# Patient Record
Sex: MALE | Employment: UNEMPLOYED | URBAN - METROPOLITAN AREA
[De-identification: names, ages, dates, MRNs, and addresses within clinical notes are randomized per-mention and may not be internally consistent; named-entity substitution may affect disease eponyms.]

---

## 2023-06-03 ENCOUNTER — HOSPITAL ENCOUNTER (INPATIENT)
Facility: CLINIC | Age: 28
LOS: 3 days | Discharge: PSYCHIATRIC OR SUBSTANCE USE FACILITY | DRG: 918 | End: 2023-06-07
Attending: EMERGENCY MEDICINE | Admitting: STUDENT IN AN ORGANIZED HEALTH CARE EDUCATION/TRAINING PROGRAM
Payer: MEDICARE

## 2023-06-03 DIAGNOSIS — T50.912A: ICD-10-CM

## 2023-06-03 DIAGNOSIS — F41.9 ANXIETY: Primary | ICD-10-CM

## 2023-06-03 DIAGNOSIS — G47.00 INSOMNIA, UNSPECIFIED TYPE: ICD-10-CM

## 2023-06-03 DIAGNOSIS — Z11.52 ENCOUNTER FOR SCREENING LABORATORY TESTING FOR COVID-19 VIRUS: ICD-10-CM

## 2023-06-03 DIAGNOSIS — T50.902A INTENTIONAL OVERDOSE, INITIAL ENCOUNTER (H): ICD-10-CM

## 2023-06-03 DIAGNOSIS — T14.91XA SUICIDE ATTEMPT (H): ICD-10-CM

## 2023-06-03 LAB
BASOPHILS # BLD AUTO: 0.1 10E3/UL (ref 0–0.2)
BASOPHILS NFR BLD AUTO: 1 %
EOSINOPHIL # BLD AUTO: 0.3 10E3/UL (ref 0–0.7)
EOSINOPHIL NFR BLD AUTO: 3 %
ERYTHROCYTE [DISTWIDTH] IN BLOOD BY AUTOMATED COUNT: 13.1 % (ref 10–15)
HCT VFR BLD AUTO: 37.7 % (ref 40–53)
HGB BLD-MCNC: 12.6 G/DL (ref 13.3–17.7)
IMM GRANULOCYTES # BLD: 0 10E3/UL
IMM GRANULOCYTES NFR BLD: 0 %
INR PPP: 1.17 (ref 0.85–1.15)
LYMPHOCYTES # BLD AUTO: 2.9 10E3/UL (ref 0.8–5.3)
LYMPHOCYTES NFR BLD AUTO: 30 %
MCH RBC QN AUTO: 29.5 PG (ref 26.5–33)
MCHC RBC AUTO-ENTMCNC: 33.4 G/DL (ref 31.5–36.5)
MCV RBC AUTO: 88 FL (ref 78–100)
MONOCYTES # BLD AUTO: 0.8 10E3/UL (ref 0–1.3)
MONOCYTES NFR BLD AUTO: 8 %
NEUTROPHILS # BLD AUTO: 5.6 10E3/UL (ref 1.6–8.3)
NEUTROPHILS NFR BLD AUTO: 58 %
NRBC # BLD AUTO: 0 10E3/UL
NRBC BLD AUTO-RTO: 0 /100
PLATELET # BLD AUTO: 319 10E3/UL (ref 150–450)
RBC # BLD AUTO: 4.27 10E6/UL (ref 4.4–5.9)
WBC # BLD AUTO: 9.7 10E3/UL (ref 4–11)

## 2023-06-03 PROCEDURE — C9803 HOPD COVID-19 SPEC COLLECT: HCPCS | Performed by: EMERGENCY MEDICINE

## 2023-06-03 PROCEDURE — 87635 SARS-COV-2 COVID-19 AMP PRB: CPT | Performed by: EMERGENCY MEDICINE

## 2023-06-03 PROCEDURE — 93010 ELECTROCARDIOGRAM REPORT: CPT | Performed by: EMERGENCY MEDICINE

## 2023-06-03 PROCEDURE — 80143 DRUG ASSAY ACETAMINOPHEN: CPT | Performed by: EMERGENCY MEDICINE

## 2023-06-03 PROCEDURE — 83690 ASSAY OF LIPASE: CPT | Performed by: EMERGENCY MEDICINE

## 2023-06-03 PROCEDURE — 93005 ELECTROCARDIOGRAM TRACING: CPT | Performed by: EMERGENCY MEDICINE

## 2023-06-03 PROCEDURE — 36415 COLL VENOUS BLD VENIPUNCTURE: CPT | Performed by: EMERGENCY MEDICINE

## 2023-06-03 PROCEDURE — 85610 PROTHROMBIN TIME: CPT | Performed by: EMERGENCY MEDICINE

## 2023-06-03 PROCEDURE — 99285 EMERGENCY DEPT VISIT HI MDM: CPT | Mod: 25 | Performed by: EMERGENCY MEDICINE

## 2023-06-03 PROCEDURE — 85014 HEMATOCRIT: CPT | Performed by: EMERGENCY MEDICINE

## 2023-06-03 PROCEDURE — 82077 ASSAY SPEC XCP UR&BREATH IA: CPT | Performed by: EMERGENCY MEDICINE

## 2023-06-03 PROCEDURE — 80179 DRUG ASSAY SALICYLATE: CPT | Performed by: EMERGENCY MEDICINE

## 2023-06-03 PROCEDURE — 80053 COMPREHEN METABOLIC PANEL: CPT | Performed by: EMERGENCY MEDICINE

## 2023-06-04 PROBLEM — T50.902A INTENTIONAL OVERDOSE, INITIAL ENCOUNTER (H): Status: ACTIVE | Noted: 2023-06-04

## 2023-06-04 PROBLEM — T14.91XA SUICIDE ATTEMPT (H): Status: ACTIVE | Noted: 2023-06-04

## 2023-06-04 LAB
ALBUMIN SERPL BCG-MCNC: 3.8 G/DL (ref 3.5–5.2)
ALBUMIN SERPL BCG-MCNC: 3.9 G/DL (ref 3.5–5.2)
ALP SERPL-CCNC: 85 U/L (ref 40–129)
ALP SERPL-CCNC: 88 U/L (ref 40–129)
ALT SERPL W P-5'-P-CCNC: 43 U/L (ref 10–50)
ALT SERPL W P-5'-P-CCNC: 45 U/L (ref 10–50)
AMPHETAMINES UR QL SCN: ABNORMAL
ANION GAP SERPL CALCULATED.3IONS-SCNC: 10 MMOL/L (ref 7–15)
ANION GAP SERPL CALCULATED.3IONS-SCNC: 9 MMOL/L (ref 7–15)
APAP SERPL-MCNC: <5 UG/ML (ref 10–30)
AST SERPL W P-5'-P-CCNC: 38 U/L (ref 10–50)
AST SERPL W P-5'-P-CCNC: 41 U/L (ref 10–50)
BARBITURATES UR QL SCN: ABNORMAL
BENZODIAZ UR QL SCN: ABNORMAL
BILIRUB SERPL-MCNC: 0.4 MG/DL
BILIRUB SERPL-MCNC: 0.6 MG/DL
BUN SERPL-MCNC: 10.2 MG/DL (ref 6–20)
BUN SERPL-MCNC: 8.3 MG/DL (ref 6–20)
BZE UR QL SCN: ABNORMAL
CALCIUM SERPL-MCNC: 8.9 MG/DL (ref 8.6–10)
CALCIUM SERPL-MCNC: 9.1 MG/DL (ref 8.6–10)
CANNABINOIDS UR QL SCN: ABNORMAL
CHLORIDE SERPL-SCNC: 103 MMOL/L (ref 98–107)
CHLORIDE SERPL-SCNC: 104 MMOL/L (ref 98–107)
CREAT SERPL-MCNC: 0.67 MG/DL (ref 0.67–1.17)
CREAT SERPL-MCNC: 0.78 MG/DL (ref 0.67–1.17)
DEPRECATED HCO3 PLAS-SCNC: 24 MMOL/L (ref 22–29)
DEPRECATED HCO3 PLAS-SCNC: 25 MMOL/L (ref 22–29)
ETHANOL SERPL-MCNC: <0.01 G/DL
GFR SERPL CREATININE-BSD FRML MDRD: >90 ML/MIN/1.73M2
GFR SERPL CREATININE-BSD FRML MDRD: >90 ML/MIN/1.73M2
GLUCOSE BLDC GLUCOMTR-MCNC: 104 MG/DL (ref 70–99)
GLUCOSE BLDC GLUCOMTR-MCNC: 105 MG/DL (ref 70–99)
GLUCOSE BLDC GLUCOMTR-MCNC: 114 MG/DL (ref 70–99)
GLUCOSE BLDC GLUCOMTR-MCNC: 116 MG/DL (ref 70–99)
GLUCOSE BLDC GLUCOMTR-MCNC: 120 MG/DL (ref 70–99)
GLUCOSE BLDC GLUCOMTR-MCNC: 122 MG/DL (ref 70–99)
GLUCOSE BLDC GLUCOMTR-MCNC: 133 MG/DL (ref 70–99)
GLUCOSE BLDC GLUCOMTR-MCNC: 93 MG/DL (ref 70–99)
GLUCOSE SERPL-MCNC: 115 MG/DL (ref 70–99)
GLUCOSE SERPL-MCNC: 93 MG/DL (ref 70–99)
LIPASE SERPL-CCNC: 21 U/L (ref 13–60)
MAGNESIUM SERPL-MCNC: 2.4 MG/DL (ref 1.7–2.3)
OPIATES UR QL SCN: ABNORMAL
POTASSIUM SERPL-SCNC: 3.6 MMOL/L (ref 3.4–5.3)
POTASSIUM SERPL-SCNC: 3.8 MMOL/L (ref 3.4–5.3)
PROT SERPL-MCNC: 6.5 G/DL (ref 6.4–8.3)
PROT SERPL-MCNC: 6.8 G/DL (ref 6.4–8.3)
SALICYLATES SERPL-MCNC: <0.3 MG/DL
SARS-COV-2 RNA RESP QL NAA+PROBE: NEGATIVE
SODIUM SERPL-SCNC: 137 MMOL/L (ref 136–145)
SODIUM SERPL-SCNC: 138 MMOL/L (ref 136–145)

## 2023-06-04 PROCEDURE — 36415 COLL VENOUS BLD VENIPUNCTURE: CPT | Performed by: INTERNAL MEDICINE

## 2023-06-04 PROCEDURE — 99223 1ST HOSP IP/OBS HIGH 75: CPT

## 2023-06-04 PROCEDURE — 80307 DRUG TEST PRSMV CHEM ANLYZR: CPT | Performed by: EMERGENCY MEDICINE

## 2023-06-04 PROCEDURE — 83735 ASSAY OF MAGNESIUM: CPT | Performed by: INTERNAL MEDICINE

## 2023-06-04 PROCEDURE — 80053 COMPREHEN METABOLIC PANEL: CPT | Performed by: STUDENT IN AN ORGANIZED HEALTH CARE EDUCATION/TRAINING PROGRAM

## 2023-06-04 PROCEDURE — 120N000002 HC R&B MED SURG/OB UMMC

## 2023-06-04 PROCEDURE — 90791 PSYCH DIAGNOSTIC EVALUATION: CPT

## 2023-06-04 PROCEDURE — 82962 GLUCOSE BLOOD TEST: CPT

## 2023-06-04 PROCEDURE — 36415 COLL VENOUS BLD VENIPUNCTURE: CPT | Performed by: STUDENT IN AN ORGANIZED HEALTH CARE EDUCATION/TRAINING PROGRAM

## 2023-06-04 PROCEDURE — 250N000013 HC RX MED GY IP 250 OP 250 PS 637: Performed by: INTERNAL MEDICINE

## 2023-06-04 PROCEDURE — 99222 1ST HOSP IP/OBS MODERATE 55: CPT | Mod: AI | Performed by: STUDENT IN AN ORGANIZED HEALTH CARE EDUCATION/TRAINING PROGRAM

## 2023-06-04 RX ORDER — POTASSIUM CHLORIDE 750 MG/1
20 TABLET, EXTENDED RELEASE ORAL ONCE
Status: COMPLETED | OUTPATIENT
Start: 2023-06-04 | End: 2023-06-04

## 2023-06-04 RX ORDER — NICOTINE POLACRILEX 4 MG
15-30 LOZENGE BUCCAL
Status: DISCONTINUED | OUTPATIENT
Start: 2023-06-04 | End: 2023-06-07 | Stop reason: HOSPADM

## 2023-06-04 RX ORDER — LORAZEPAM 1 MG/1
2 TABLET ORAL EVERY 8 HOURS PRN
Status: DISCONTINUED | OUTPATIENT
Start: 2023-06-04 | End: 2023-06-07 | Stop reason: HOSPADM

## 2023-06-04 RX ORDER — HALOPERIDOL 5 MG/ML
5 INJECTION INTRAMUSCULAR EVERY 8 HOURS PRN
Status: DISCONTINUED | OUTPATIENT
Start: 2023-06-04 | End: 2023-06-07 | Stop reason: HOSPADM

## 2023-06-04 RX ORDER — DIPHENHYDRAMINE HCL 25 MG
50 CAPSULE ORAL EVERY 8 HOURS PRN
Status: DISCONTINUED | OUTPATIENT
Start: 2023-06-04 | End: 2023-06-07 | Stop reason: HOSPADM

## 2023-06-04 RX ORDER — HALOPERIDOL 5 MG/1
5 TABLET ORAL EVERY 8 HOURS PRN
Status: DISCONTINUED | OUTPATIENT
Start: 2023-06-04 | End: 2023-06-07 | Stop reason: HOSPADM

## 2023-06-04 RX ORDER — HYDROXYZINE HYDROCHLORIDE 25 MG/1
25 TABLET, FILM COATED ORAL EVERY 6 HOURS PRN
Status: DISCONTINUED | OUTPATIENT
Start: 2023-06-04 | End: 2023-06-07 | Stop reason: HOSPADM

## 2023-06-04 RX ORDER — LIDOCAINE 40 MG/G
CREAM TOPICAL
Status: DISCONTINUED | OUTPATIENT
Start: 2023-06-04 | End: 2023-06-07 | Stop reason: HOSPADM

## 2023-06-04 RX ORDER — LORAZEPAM 2 MG/ML
1-2 INJECTION INTRAMUSCULAR EVERY 4 HOURS PRN
Status: DISCONTINUED | OUTPATIENT
Start: 2023-06-04 | End: 2023-06-07 | Stop reason: HOSPADM

## 2023-06-04 RX ORDER — DIPHENHYDRAMINE HYDROCHLORIDE 50 MG/ML
50 INJECTION INTRAMUSCULAR; INTRAVENOUS EVERY 8 HOURS PRN
Status: DISCONTINUED | OUTPATIENT
Start: 2023-06-04 | End: 2023-06-07 | Stop reason: HOSPADM

## 2023-06-04 RX ORDER — LORAZEPAM 2 MG/ML
2 INJECTION INTRAMUSCULAR EVERY 8 HOURS PRN
Status: DISCONTINUED | OUTPATIENT
Start: 2023-06-04 | End: 2023-06-07 | Stop reason: HOSPADM

## 2023-06-04 RX ORDER — VENLAFAXINE HYDROCHLORIDE 150 MG/1
150 CAPSULE, EXTENDED RELEASE ORAL EVERY MORNING
Status: ON HOLD | COMMUNITY
End: 2023-06-07

## 2023-06-04 RX ORDER — DEXTROSE MONOHYDRATE 25 G/50ML
25-50 INJECTION, SOLUTION INTRAVENOUS
Status: DISCONTINUED | OUTPATIENT
Start: 2023-06-04 | End: 2023-06-07 | Stop reason: HOSPADM

## 2023-06-04 RX ORDER — OLANZAPINE 10 MG/1
20 TABLET ORAL AT BEDTIME
Status: ON HOLD | COMMUNITY
End: 2023-06-05

## 2023-06-04 RX ORDER — HYDROXYZINE HYDROCHLORIDE 50 MG/1
50 TABLET, FILM COATED ORAL EVERY 6 HOURS PRN
Status: DISCONTINUED | OUTPATIENT
Start: 2023-06-04 | End: 2023-06-07 | Stop reason: HOSPADM

## 2023-06-04 RX ADMIN — POTASSIUM CHLORIDE 20 MEQ: 750 TABLET, EXTENDED RELEASE ORAL at 17:23

## 2023-06-04 ASSESSMENT — COLUMBIA-SUICIDE SEVERITY RATING SCALE - C-SSRS
LETHALITY/MEDICAL DAMAGE CODE MOST RECENT POTENTIAL ATTEMPT: BEHAVIOR LIKELY TO RESULT IN DEATH DESPITE AVAILABLE MEDICAL CARE
LETHALITY/MEDICAL DAMAGE CODE FIRST POTENTIAL ATTEMPT: BEHAVIOR LIKELY TO RESULT IN DEATH DESPITE AVAILABLE MEDICAL CARE
TOTAL  NUMBER OF ACTUAL ATTEMPTS PAST 3 MONTHS: 1
TOTAL  NUMBER OF ABORTED OR SELF INTERRUPTED ATTEMPTS LIFETIME: NO
4. HAVE YOU HAD THESE THOUGHTS AND HAD SOME INTENTION OF ACTING ON THEM?: YES
ATTEMPT PAST THREE MONTHS: YES
MOST LETHAL DATE: 66628
1. HAVE YOU WISHED YOU WERE DEAD OR WISHED YOU COULD GO TO SLEEP AND NOT WAKE UP?: YES
TOTAL  NUMBER OF ACTUAL ATTEMPTS LIFETIME: 2
TOTAL  NUMBER OF INTERRUPTED ATTEMPTS LIFETIME: NO
REASONS FOR IDEATION LIFETIME: MOSTLY TO END OR STOP THE PAIN (YOU COULDN'T GO ON LIVING WITH THE PAIN OR HOW YOU WERE FEELING)
5. HAVE YOU STARTED TO WORK OUT OR WORKED OUT THE DETAILS OF HOW TO KILL YOURSELF? DO YOU INTEND TO CARRY OUT THIS PLAN?: YES
LETHALITY/MEDICAL DAMAGE CODE FIRST ACTUAL ATTEMPT: MODERATE PHYSICAL DAMAGE, MEDICAL ATTENTION NEEDED
ATTEMPT LIFETIME: YES
LETHALITY/MEDICAL DAMAGE CODE MOST RECENT ACTUAL ATTEMPT: MODERATE PHYSICAL DAMAGE, MEDICAL ATTENTION NEEDED
4. HAVE YOU HAD THESE THOUGHTS AND HAD SOME INTENTION OF ACTING ON THEM?: YES
2. HAVE YOU ACTUALLY HAD ANY THOUGHTS OF KILLING YOURSELF?: YES
5. HAVE YOU STARTED TO WORK OUT OR WORKED OUT THE DETAILS OF HOW TO KILL YOURSELF? DO YOU INTEND TO CARRY OUT THIS PLAN?: YES
2. HAVE YOU ACTUALLY HAD ANY THOUGHTS OF KILLING YOURSELF?: YES
1. IN THE PAST MONTH, HAVE YOU WISHED YOU WERE DEAD OR WISHED YOU COULD GO TO SLEEP AND NOT WAKE UP?: YES
3. HAVE YOU BEEN THINKING ABOUT HOW YOU MIGHT KILL YOURSELF?: YES
LETHALITY/MEDICAL DAMAGE CODE MOST LETHAL POTENTIAL ATTEMPT: BEHAVIOR LIKELY TO RESULT IN DEATH DESPITE AVAILABLE MEDICAL CARE
REASONS FOR IDEATION PAST MONTH: MOSTLY TO END OR STOP THE PAIN (YOU COULDN'T GO ON LIVING WITH THE PAIN OR HOW YOU WERE FEELING)
MOST RECENT DATE: 66628
LETHALITY/MEDICAL DAMAGE CODE MOST LETHAL ACTUAL ATTEMPT: MODERATE PHYSICAL DAMAGE, MEDICAL ATTENTION NEEDED
6. HAVE YOU EVER DONE ANYTHING, STARTED TO DO ANYTHING, OR PREPARED TO DO ANYTHING TO END YOUR LIFE?: NO

## 2023-06-04 ASSESSMENT — ACTIVITIES OF DAILY LIVING (ADL)
DOING_ERRANDS_INDEPENDENTLY_DIFFICULTY: NO
ADLS_ACUITY_SCORE: 19
ADLS_ACUITY_SCORE: 29
ADLS_ACUITY_SCORE: 29
ADLS_ACUITY_SCORE: 19
CHANGE_IN_FUNCTIONAL_STATUS_SINCE_ONSET_OF_CURRENT_ILLNESS/INJURY: NO
CONCENTRATING,_REMEMBERING_OR_MAKING_DECISIONS_DIFFICULTY: NO
WEAR_GLASSES_OR_BLIND: NO
TOILETING_ISSUES: NO
DIFFICULTY_EATING/SWALLOWING: NO
FALL_HISTORY_WITHIN_LAST_SIX_MONTHS: NO
ADLS_ACUITY_SCORE: 35
ADLS_ACUITY_SCORE: 19
ADLS_ACUITY_SCORE: 29
ADLS_ACUITY_SCORE: 18
ADLS_ACUITY_SCORE: 18
WALKING_OR_CLIMBING_STAIRS_DIFFICULTY: NO
ADLS_ACUITY_SCORE: 35
ADLS_ACUITY_SCORE: 29
ADLS_ACUITY_SCORE: 19
DRESSING/BATHING_DIFFICULTY: NO

## 2023-06-04 NOTE — H&P
[facility]    History and Physical - Hospitalist Service, GOLD TEAM        Date of Admission:  6/3/2023    Assessment & Plan      Serg Tejada is a 28 year old male admitted on 6/3/2023. He is admitted for monitoring after intentional overdose of unknown medication and psychosis in the setting of medication non-adherance.     Intentional overdose, unknown medication  Poison control contacted in the ED, primary concern is for ingestion of venlafaxine with risk of seizures and hypoglycemia. Monitor for 12-18 hours, if medically stable at this point, would be ready for transfer to inpatient psych.   -q2h glucose  -Telemetry  -Seizure precautions  -Bedside attendant     Schizophrenia, unspecified type  Group home reports that he has not been taking his medications. Given unknown medication, will hold psychiatric meds until medical stability established. Will need inpatient psych. Will use benzodiazepines if necessary for agitation, would avoid antipsychotics given unknown ingestion.   -Hold PTA Olanzapine 20 mg at bedtime, venlafaxine 150 mg daily          Diet:   ADAT  DVT Prophylaxis: Low Risk/Ambulatory with no VTE prophylaxis indicated  Earl Catheter: Not present  Lines: None     Cardiac Monitoring: None  Code Status:   Ful;    Clinically Significant Risk Factors Present on Admission               # Coagulation Defect: INR = 1.17 (Ref range: 0.85 - 1.15) and/or PTT = N/A, will monitor for bleeding                  Disposition Plan      Expected Discharge Date: 06/06/2023                  Raheem Castañeda MD  Hospitalist Service, GOLD TEAM   [facility]  Securely message with Share[employer] (more info)  Text page via [facility] Paging/Directory   See signed in provider for up to date coverage information    ______________________________________________________________________    Chief Complaint   Overdose    History of Present  Illness   Serg Tejada is a 28 year old male who presents after intentional overdose of unknown medication. He has a history of schizophrenia for which he has been prescribed olanzapine and venlafaxine. He reports finding a back of old pills in his things, and taking 40-60 of them in a suicide attempt. He currently denies any chest pain, palpitations, abdominal pain, nausea, vomiting, or headache. Of note, he recently lost his mother and was feeling more depressed. He states he talks to God but denies command hallucinations.     Poison control was spoken with in the ED. Most concerning is the possibility of venlafaxine overdose. Concern for hypoglycemia and seizures. Recommend 12-18 hours of monitoring. Given large ingestion, would err on the longer side, as medication can clump in the stomach causing delayed onset of symptoms after 12 hours.       Past Medical History    History reviewed. No pertinent past medical history.    Past Surgical History   History reviewed. No pertinent surgical history.    Prior to Admission Medications   None        Physical Exam   Vital Signs: Temp: 97.7  F (36.5  C) Temp src: Oral BP: 128/86 Pulse: 89   Resp: 21 SpO2: 97 % O2 Device: None (Room air)    Weight: 423 lbs 0 oz    Constitutional: blunted affect, alert, interactive  Eyes: lids and lashes normal and extra-ocular muscles intact  ENT: normocepalic, without obvious abnormality  Respiratory: No increased work of breathing  Cardiovascular: regular rate and rhythm  GI: soft and non-distended  Skin: no bruising or bleeding  Neuropsychiatric: General: calm and poor eye contact  Level of consciousness: alert / normal  Affect: flat  Orientation: oriented to self, place, time and situation    Medical Decision Making       60 MINUTES SPENT BY ME on the date of service doing chart review, history, exam, documentation & further activities per the note.      Data     I have personally reviewed the following data over the past 24  hrs:    9.7  \   12.6 (L)   / 319     138 104 10.2 /  93   3.6 25 0.78 \       ALT: 43 AST: 41 AP: 85 TBILI: 0.4   ALB: 3.8 TOT PROTEIN: 6.5 LIPASE: 21       INR:  1.17 (H) PTT:  N/A   D-dimer:  N/A Fibrinogen:  N/A       Imaging results reviewed over the past 24 hrs:   No results found for this or any previous visit (from the past 24 hour(s)).

## 2023-06-04 NOTE — CONSULTS
Serg Tejada MRN# [id number]   Age: 28 year old YOB: 1995   Date of Admission to ED: 6/3/2023    In person visit Details:     Patient was assessed and interviewed face-to-face in person with this writer obed. Patient was observed to be able to participate in the assessment as evidenced by verbal consent. Assessment methods included conducting a formal interview with patient, review of medical records, collaboration with medical staff, and obtaining relevant collateral information from family and community providers when available.        Reason for Consult:   Psychiatry consult was requested by ED provider due to suicidal attempt in the auditory hallucination and delusion.    Writer met patient in his room face-to-face, patient was alert and oriented pleasant and cooperative during assessment and interview.  During assessment patient admitted attempt suicide by taking 2 bottles of his prescription medication although he is not aware of which medications he took.  Patient said his suicidal ideation was triggered due to passing of his mother 3 weeks ago.  During assessment and interview patient continued to respond to internal stimuli although he was sedated.  Currently patient has auditory and visual hallucination.  Per chart review patient have outpatient psychiatry Dr. Bauer who has been getting olanzapine 20 mg at bedtime and 5 mg in the morning, venlafaxine 150 mg daily, for his schizoaffective disorder and currently patient resides in group home.  Currently patient is voluntary for inpatient mental health unit when medically cleared from his overdoses, if patient attempt to leave AGAINST MEDICAL ADVICE patient is holdable.  We will hold his neuroleptic medications and venlafaxine for now.  Continue supportive therapy and frequent assessment for NMS and serotonin syndrome      I have reviewed the nursing notes. I have reviewed the findings, diagnosis, plan and need for follow up with the  "patient.         HPI:     Per ED provider Dr. Castrejon note Serg Tejada is a 28 year old male who has a history significant for psychosis and major depressive disorder arriving today to the emergency department via EMS for reported overdose with a suicide attempt.  The patient reports to me taking approximately 40 to 60 tablets of unknown medications in his backpack which have been in there for several years.  He states he recently lost his mother and was feeling more depressed.  He states he talks to God but denies obvious command hallucinations.  Patient denies specifically any ingestion of acetaminophen, ibuprofen, alcohol, other illicit substances.  Patient reports he has acted on his thoughts in the past with overdose x2 previously.  He denies recent trauma or medical illness such as fever, chills, nausea, vomiting, diarrhea.  He reports he is otherwise asymptomatic at this time other than feeling somewhat \"tired\".  Otherwise patient does seem to be responding to internal stimuli and provides no other further contributing history.  No other immediate family member available to confirm or deny story          Pt has *not required locked seclusion or restraints in the past 24 hours to maintain safety, please refer to RN documentation for further details.  Substance use does not appear to be playing a contributing role in the patient's presentation.*  Brief Therapeutic Intervention(s):   Provided active listening, unconditional positive regard, and validation. Engaged in cognitive restructuring/ reframing, looked at common cognitive distortions and challenged negative thoughts. Engaged in guided discovery, explored patient's perspectives and helped expand them through socratic dialogue. Provided positive reinforcement for progress towards goals, gains in knowledge, and application of skills previously taught.  Engaged in social skills training. Explored and identified early warning signs to anger.        Past " Psychiatric History:   Hx of schizoaffective disorder bipolar type. Pt endorses speaking with God for the past 4 years. Responding to internal stimuli throughout assessment. Pt has a psychiatrist and a . Pt was hospitalized 4/15/2022 for psychosis. Living at [facility] in 2022. Believed that pt transitioned to group home facility after [facility] stay. Trauma history unknown.           Substance Use and History:     Patient is positive for drug urine toxicology for marijuana      Past Medical History:   PAST MEDICAL HISTORY: History reviewed. No pertinent past medical history.    PAST SURGICAL HISTORY: History reviewed. No pertinent surgical history.            Allergies:   No Known Allergies          Medications:     I have reviewed this patient's current medications  Current Facility-Administered Medications   Medication     glucose gel 15-30 g    Or     dextrose 50 % injection 25-50 mL    Or     glucagon injection 1 mg     haloperidol (HALDOL) tablet 5 mg    And     LORazepam (ATIVAN) tablet 2 mg    And     diphenhydrAMINE (BENADRYL) capsule 50 mg     haloperidol lactate (HALDOL) injection 5 mg    And     LORazepam (ATIVAN) injection 2 mg    And     diphenhydrAMINE (BENADRYL) injection 50 mg     hydrOXYzine (ATARAX) tablet 25 mg    Or     hydrOXYzine (ATARAX) tablet 50 mg     lidocaine (LMX4) cream     lidocaine 1 % 0.1-1 mL     LORazepam (ATIVAN) injection 1-2 mg     melatonin tablet 1 mg     sodium chloride (PF) 0.9% PF flush 3 mL     sodium chloride (PF) 0.9% PF flush 3 mL              Family History:   FAMILY HISTORY: History reviewed. No pertinent family history.        Social History:   SOCIAL HISTORY:   Social History     Tobacco Use     Smoking status: Every Day     Types: Cigarettes     Smokeless tobacco: Never   Vaping Use     Vaping status: Not on file   Substance Use Topics     Alcohol use: Not Currently            PTA Medications:     Medications Prior to Admission   Medication Sig  Dispense Refill Last Dose     OLANZapine (ZYPREXA) 10 MG tablet Take 20 mg by mouth At Bedtime        venlafaxine (EFFEXOR XR) 150 MG 24 hr capsule Take 150 mg by mouth daily             Allergies:   No Known Allergies       Labs:     Recent Results (from the past 48 hour(s))   EKG 12 lead    Collection Time: 06/03/23 11:18 PM   Result Value Ref Range    Systolic Blood Pressure  mmHg    Diastolic Blood Pressure  mmHg    Ventricular Rate 87 BPM    Atrial Rate 87 BPM    CT Interval 126 ms    QRS Duration 114 ms     ms    QTc 474 ms    P Axis 53 degrees    R AXIS 26 degrees    T Axis 12 degrees    Interpretation ECG Sinus rhythm  Normal ECG      Acetaminophen level    Collection Time: 06/03/23 11:41 PM   Result Value Ref Range    Acetaminophen <5.0 (L) 10.0 - 30.0 ug/mL   Salicylate level    Collection Time: 06/03/23 11:41 PM   Result Value Ref Range    Salicylate <0.3   mg/dL   Comprehensive metabolic panel    Collection Time: 06/03/23 11:41 PM   Result Value Ref Range    Sodium 138 136 - 145 mmol/L    Potassium 3.6 3.4 - 5.3 mmol/L    Chloride 104 98 - 107 mmol/L    Carbon Dioxide (CO2) 25 22 - 29 mmol/L    Anion Gap 9 7 - 15 mmol/L    Urea Nitrogen 10.2 6.0 - 20.0 mg/dL    Creatinine 0.78 0.67 - 1.17 mg/dL    Calcium 8.9 8.6 - 10.0 mg/dL    Glucose 93 70 - 99 mg/dL    Alkaline Phosphatase 85 40 - 129 U/L    AST 41 10 - 50 U/L    ALT 43 10 - 50 U/L    Protein Total 6.5 6.4 - 8.3 g/dL    Albumin 3.8 3.5 - 5.2 g/dL    Bilirubin Total 0.4 <=1.2 mg/dL    GFR Estimate >90 >60 mL/min/1.73m2   Lipase    Collection Time: 06/03/23 11:41 PM   Result Value Ref Range    Lipase 21 13 - 60 U/L   INR    Collection Time: 06/03/23 11:41 PM   Result Value Ref Range    INR 1.17 (H) 0.85 - 1.15   Ethyl Alcohol Level    Collection Time: 06/03/23 11:41 PM   Result Value Ref Range    Alcohol ethyl <0.01 <=0.01 g/dL   CBC with platelets and differential    Collection Time: 06/03/23 11:41 PM   Result Value Ref Range    WBC Count 9.7  "4.0 - 11.0 10e3/uL    RBC Count 4.27 (L) 4.40 - 5.90 10e6/uL    Hemoglobin 12.6 (L) 13.3 - 17.7 g/dL    Hematocrit 37.7 (L) 40.0 - 53.0 %    MCV 88 78 - 100 fL    MCH 29.5 26.5 - 33.0 pg    MCHC 33.4 31.5 - 36.5 g/dL    RDW 13.1 10.0 - 15.0 %    Platelet Count 319 150 - 450 10e3/uL    % Neutrophils 58 %    % Lymphocytes 30 %    % Monocytes 8 %    % Eosinophils 3 %    % Basophils 1 %    % Immature Granulocytes 0 %    NRBCs per 100 WBC 0 <1 /100    Absolute Neutrophils 5.6 1.6 - 8.3 10e3/uL    Absolute Lymphocytes 2.9 0.8 - 5.3 10e3/uL    Absolute Monocytes 0.8 0.0 - 1.3 10e3/uL    Absolute Eosinophils 0.3 0.0 - 0.7 10e3/uL    Absolute Basophils 0.1 0.0 - 0.2 10e3/uL    Absolute Immature Granulocytes 0.0 <=0.4 10e3/uL    Absolute NRBCs 0.0 10e3/uL   Asymptomatic COVID-19 Virus (Coronavirus) by PCR Nose    Collection Time: 06/03/23 11:52 PM    Specimen: Nose; Swab   Result Value Ref Range    SARS CoV2 PCR Negative Negative   Glucose by meter    Collection Time: 06/04/23 12:50 AM   Result Value Ref Range    GLUCOSE BY METER POCT 93 70 - 99 mg/dL   Drug abuse screen 1 urine (ED)    Collection Time: 06/04/23  1:20 AM   Result Value Ref Range    Amphetamines Urine Screen Negative Screen Negative    Barbituates Urine Screen Negative Screen Negative    Benzodiazepine Urine Screen Negative Screen Negative    Cannabinoids Urine Screen Positive (A) Screen Negative    Cocaine Urine Screen Negative Screen Negative    Opiates Urine Screen Negative Screen Negative   Glucose by meter    Collection Time: 06/04/23  2:53 AM   Result Value Ref Range    GLUCOSE BY METER POCT 116 (H) 70 - 99 mg/dL          Physical and Psychiatric Examination:     BP 92/63 (BP Location: Right arm, Patient Position: Supine, Cuff Size: Adult Large)   Pulse 91   Temp 98.1  F (36.7  C) (Axillary)   Resp 18   Ht 1.93 m (6' 3.98\")   Wt (!) 190.2 kg (419 lb 4.8 oz)   SpO2 94%   BMI 51.06 kg/m    Weight is 419 lbs 4.8 oz  Body mass index is 51.06 " kg/m .    Mental Status Exam:  Appearance: poorly groomed  Attitude:  guarded  Eye Contact:  poor   Mood:  sad  and depressed  Affect:  appropriate and in normal range  Speech:  clear, coherent and decreased prosody  Language: fluent and intact in English  Psychomotor, Gait, Musculoskeletal:  no evidence of tardive dyskinesia, dystonia, or tics  Thought Process:  logical and linear  Associations:  no loose associations  Thought Content:  active suicidal ideation present, auditory hallucinations present, visual hallucinations present and patient appears to be responding to internal stimuli  Insight:  poor  Judgement:  poor  Oriented to:  time, person, and place  Attention Span and Concentration:  poor  Recent and Remote Memory:  poor  Fund of Knowledge:  low-normal         Diagnoses:      Suicide attempt (H)  Intentional overdose, initial encounter (H)         Recommendations:       1. Pt displays the following risk factors that support IP admission:  Attempt suicide by her report does of his prescription olanzapine and venlafaxine unknown amount, unable to contract for safety. Pt is unable to engage in safety planning to mitigate risk level in a non-secure setting. Lower levels of care have not been successful in mitigating risk. Due to this IP is the least restrictive option of care for pt. Pt should remain in IP until deemed safe to return to the community and engage in OP MH supports    - Continue to recommend inpatient psychiatric hospitalizations for further stabilization  2.  Patient currently voluntary for inpatient psychiatric unit he is holdable if he attempt to leave AGAINST MEDICAL ADVICE  3.  Continue cardiac monitoring and seizure precaution, vital signs every 4 hours continue assess for serotonin syndrome, and neuroleptic malignant syndrome (NMS), extrapyramidal side effect (EPS), akathisia  4.  Ativan 1-2 mg every 4 hours as as needed if patient develop EPS or NMS  5.  Haldol 5 mg, Ativan 2 mg,  Benadryl 50 mg p.o. or IM for severe agitation and aggression.  Hydroxyzine 25 to 50 mg as needed for anxiety  6.  Consult psychiatry as needed     treatment per ED team    - Consulted with , ED physician, patient's RN Marleni regarding this case.    Please call [facility]/DEC at [phone] if you have follow-up questions or wish to place another consult.  Curtis Padilla, Psychiatric Nurse practitioner    Attestation:  Time with:  Patient: 15 minutes  Treatment Team: 10 Minutes  Chart Review: 60 minutes    Total time spent was 85 minutes. Over 50% of times was spent counseling and coordination of care.    I, Curtis Padilla, CNP, APRN, Psychiatric Nurse Practitioner have personally performed an examination of this patient.  I have edited the note to reflect all relevant changes.  I have discussed this patient with the care team June 4, 2023 I have reviewed all vitals and laboratory findings.    Disclaimer: This note consists of symbols derived from keyboarding,

## 2023-06-04 NOTE — ED NOTES
Patient received in signout from Dr. Castrejon.  See his note for further documentation.  Patient presents to the ED with intentional overdose of 40-60 unknown pills.  Previously patient had been prescribed venlafaxine, Seroquel, trazodone, Abilify, olanzapine, and trazodone.  Spoke with poison control.  Most concerning overdose would be venlafaxine.  Recommend medical monitoring for 12 to 18 hours with acute 2-hour glucose checks and seizure precautions.    Patient was also evaluated by the mental health team. See their note. Patient continues to respond to internal stimuli.  Will certainly need inpatient mental health admission.    Patient will be admitted to the medicine team.  Will likely need transfer to the inpatient psychiatry team once cleared from overdose standpoint     Chris Fernando,   06/04/23 0042

## 2023-06-04 NOTE — PLAN OF CARE
Goal Outcome Evaluation:         Pt A&Ox4 this shift. VSS. Pt denies chest pain, SOB, N/V, N/T. Pt states no pain. On continuous tele NSR. Q2 BS checks.  Pt denies SI. Psych consult, no new orders. Replaced pt K, redraw in am. Family present in room today, writer informed that pt is hold able if he tries to leave. Pt states just sad and feels very tired. Pt has good appetite. Removed IV, pain stated pain. Pt up and in shower multiple times throughout the day. Pt on 1:1.

## 2023-06-04 NOTE — PLAN OF CARE
Admitted from group home.   Reason for admission: Suicide attempt  Skin assessment completed. Skin intact at this time with no concerns  Height and weight documented into flow sheet. Patient remains on 1:1 observation for safety due to suicidal ideation. Suicidal precaution initiated. Continent of bowel and bladder. Independent to the bathroom. Slept through the rest of the shift. Responding to internal stimuli. Denied any pain or discomfort. Continue per POC.

## 2023-06-04 NOTE — ED PROVIDER NOTES
"  ED PROVIDER NOTE  Beatrice 3, 2023  History     Chief Complaint   Patient presents with     Suicide Attempt     Suicide attempt. Claims he took 60 pills of unknown medication. Old prescription. Denies HI. Auditory hallucinations. Address [address] [facility].      HPI  Serg Tejada is a 28 year old male who has a history significant for psychosis and major depressive disorder arriving today to the emergency department via EMS for reported overdose with a suicide attempt.  The patient reports to me taking approximately 40 to 60 tablets of unknown medications in his backpack which have been in there for several years.  He states he recently lost his mother and was feeling more depressed.  He states he talks to God but denies obvious command hallucinations.  Patient denies specifically any ingestion of acetaminophen, ibuprofen, alcohol, other illicit substances.  Patient reports he has acted on his thoughts in the past with overdose x2 previously.  He denies recent trauma or medical illness such as fever, chills, nausea, vomiting, diarrhea.  He reports he is otherwise asymptomatic at this time other than feeling somewhat \"tired\".  Otherwise patient does seem to be responding to internal stimuli and provides no other further contributing history.  No other immediate family member available to confirm or deny story.      Past Medical History  History reviewed. No pertinent past medical history.  History reviewed. No pertinent surgical history.  No current outpatient medications on file.    No Known Allergies  Family History  History reviewed. No pertinent family history.  Social History   Social History     Tobacco Use     Smoking status: Every Day     Types: Cigarettes     Smokeless tobacco: Never   Substance Use Topics     Alcohol use: Not Currently     Drug use: Not Currently         A medically appropriate review of systems was performed with pertinent positives and negatives noted in the HPI, and all other " systems negative.      Physical Exam   BP: 128/86  Pulse: 87  Temp: 97.7  F (36.5  C)  Resp: 16  Weight: (!) 191.9 kg (423 lb)  SpO2: 95 %      Physical Exam  Vitals and nursing note reviewed.   Constitutional:       General: He is in acute distress.      Appearance: Normal appearance. He is obese. He is not ill-appearing, toxic-appearing or diaphoretic.   HENT:      Head: Normocephalic and atraumatic.      Right Ear: External ear normal.      Left Ear: External ear normal.      Nose: Nose normal. No congestion.      Mouth/Throat:      Mouth: Mucous membranes are moist.      Pharynx: Oropharynx is clear. No oropharyngeal exudate.   Eyes:      Extraocular Movements: Extraocular movements intact.      Conjunctiva/sclera: Conjunctivae normal.      Pupils: Pupils are equal, round, and reactive to light.   Cardiovascular:      Rate and Rhythm: Normal rate.      Pulses: Normal pulses.      Heart sounds: Normal heart sounds. No murmur heard.     No friction rub.   Pulmonary:      Effort: Pulmonary effort is normal. No respiratory distress.      Breath sounds: No stridor. No wheezing, rhonchi or rales.   Abdominal:      General: Abdomen is flat. There is no distension.      Tenderness: There is no abdominal tenderness. There is no guarding or rebound.   Musculoskeletal:         General: No deformity or signs of injury. Normal range of motion.      Cervical back: Normal range of motion.   Skin:     General: Skin is warm.      Capillary Refill: Capillary refill takes less than 2 seconds.      Coloration: Skin is not pale.      Findings: No bruising or erythema.   Neurological:      General: No focal deficit present.      Cranial Nerves: No cranial nerve deficit.      Motor: No weakness.   Psychiatric:         Attention and Perception: He perceives auditory and visual hallucinations.         Mood and Affect: Affect is blunt.         Behavior: Behavior is slowed.         Thought Content: Thought content includes suicidal  ideation. Thought content includes suicidal plan.         Judgment: Judgment is impulsive.         ED Course        Procedures         No results found for this or any previous visit (from the past 24 hour(s)).  Medications - No data to display          Assessments & Plan (with Medical Decision Making)     Serg Tejada is a 28 year old male who has a history significant for psychosis and major depressive disorder arriving today to the emergency department via EMS for reported overdose with a suicide attempt.  Upon arrival patient is noted to be alert.  Is presently afebrile and hemodynamically stable lying supine in bed.  He appears nontoxic at this time.  From a medical standpoint he has no visible signs of external trauma to the head or neck.  He is oriented to place at this time but does seem to be responding to internal stimuli.  Stat EKG obtained demonstrating no prolongation of QTc on my interpretation.  There is no interval abnormality, ST changes or PVCs.  Unclear if patient ingested prescribed medications or not I would plan for acetaminophen and salicylate levels.  We will add CBC and CMP.  Patient does not provide any further history on specific medication but will require close clinical monitoring.  Otherwise from a psychiatric standpoint as noted above he seems to be responding to internal stimuli at times laughing inappropriately with fleeting glances around the room as if he is experiencing hallucinations.  We will keep him on one-to-one monitoring at this time and continue to observe vital signs.    I did review his chart.  The patient previously has been prescribed venlafaxine, Seroquel, trazodone, Abilify, olanzapine, trazodone.  Unclear if patient had any or all of these medications.    I did call for consultation with the poison  who is recommended 12 to 18 hours of clinical monitoring of most concern for possible venlafaxine overdose with risks of development of fairly  severe hypoglycemia or seizures.  Recommendation provided for acute 2-hour glucose checks overnight.  They did mention that patients at times can be asymptomatic and not develop symptoms up until 12 hours.  Otherwise with regard to possible antipsychotic overdose monitoring for possible sedation or hypotension.  Secondary to concern of possible significant overdose I do believe he requires medical monitoring overnight.    This patient was signed over to my colleague at midnight pending return of laboratory studies and clinical monitoring to determine appropriate level of medical admission.     I have reviewed the nursing notes.    I have reviewed the findings, diagnosis, plan and need for follow up with the patient.    New Prescriptions    No medications on file       Final diagnoses:   Suicide attempt (H)   Intentional overdose, initial encounter (H)       CHRIS NIÑO MD    6/3/2023   [facility] EMERGENCY DEPARTMENT     Chris Niño MD  06/03/23 [id number]

## 2023-06-04 NOTE — ED TRIAGE NOTES
Patient reports taking 40-60 pills from a bag of his old medications, SI attempt. Patient reports he talks to god. History of bipolar and schizophrenia, reports taking medications currently.      Triage Assessment     Row Name 06/03/23 6822       Triage Assessment (Adult)    Airway WDL WDL       Respiratory WDL    Respiratory WDL WDL       Skin Circulation/Temperature WDL    Skin Circulation/Temperature WDL WDL       Cardiac WDL    Cardiac WDL WDL       Peripheral/Neurovascular WDL    Peripheral Neurovascular WDL WDL       Cognitive/Neuro/Behavioral WDL    Cognitive/Neuro/Behavioral WDL WDL

## 2023-06-04 NOTE — ED NOTES
Bed: ED09  Expected date: 6/3/23  Expected time: 10:52 PM  Means of arrival:   Comments:  H428  28 M  /Ingestion

## 2023-06-04 NOTE — CONSULTS
Diagnostic Evaluation Consultation  Crisis Assessment    Patient was assessed: In Person  Patient location: [facility] ED   Was a release of information signed: No. Reason: declines      Referral Data and Chief Complaint  Serg is a 28 year old, who uses he/him pronouns, and presents to the ED via EMS. Patient is referred to the ED by self. Patient is presenting to the ED for the following concerns: ingestion, suicide attempt.      Informed Consent and Assessment Methods     Patient is his own guardian. Writer met with patient and explained the crisis assessment process, including applicable information disclosures and limits to confidentiality, assessed understanding of the process, and obtained consent to proceed with the assessment. Patient was observed to be able to participate in the assessment as evidenced by responding to assessment questions . Assessment methods included conducting a formal interview with patient, review of medical records, collaboration with medical staff, and obtaining relevant collateral information from family and community providers when available..     Over the course of this crisis assessment provided reassurance, offered validation and engaged patient in problem solving and disposition planning. Patient's response to interventions was positive.      Summary of Patient Situation     Pt presents to ED for ingestion. Pt reports taking 40-60 pills (unknown what kind) that he found in the bottom of one of his old backpacks. Pt reports feeling increasingly suicidal for the past 3 days and decided to attempt suicide today. Pt denies any recent life changes or stressors and cannot articulate why he acted on his suicidal thoughts. Pt reports a previous ingestion 1 year ago which resulted in a hospitalization. Pt currently resides in a group home. Pt has been compliant with his medication. He appears to be responding to internal stimuli. Per chart review, this occurs at baseline. Pt states he is  "talking to God and God talks back. Pt presented in same manner in 2022. Pt reports the conversations are positive and not commanding. Pt has a psychiatrist and . Pt informed doctor that his mother passed away recently which caused him to feel more depressed. Pt reports he continues to feel suicidal and has thoughts of wishing his attempt worked and he was no longer living.     Brief Psychosocial History     PT lives at [facility]. Pt reports that his mother passed away recently. Pt is not working or attending school currently. He does not identify hobbies or supports, although reports that his group home is \"fine\". Denies legal issues and denies commitment history.     Significant Clinical History     Hx of schizoaffective disorder bipolar type. Pt endorses speaking with God for the past 4 years. Responding to internal stimuli throughout assessment. Pt has a psychiatrist and a . Pt was hospitalized 4/15/2022 for psychosis. Living at [facility] in 2022. Believed that pt transitioned to group home facility after [facility] stay. Trauma history unknown.      Collateral Information    The following information was received from Yuliya Haynes whose relationship to the patient is group home staff. Information was obtained via phone. Their phone number is [phone] and they last had contact with patient on today.    What happened today: Didn't know what happened     What is different about patient's functioning: He has seemed off lately. Seems more psychotic than usual. Also his mother passed away recently. He has missed many doses of his medications.     Concern about alcohol/drug use: No    What do you think the patient needs: In patient     Has patient made comments about wanting to kill themselves/others:  No    If d/c is recommended, can they take part in safety/aftercare planning: Yes pt likely welcomed back but manager/director would have to confirm     Other information: Group home staff " will contact hospital on Monday to give pt's medication list          Risk Assessment  [place] Suicide Severity Rating Scale Full Clinical Version: High risk   Suicidal Ideation  1. Wish to be Dead (Lifetime): Yes  1. Wish to be Dead (Past 1 Month): Yes  2. Non-Specific Active Suicidal Thoughts (Lifetime): Yes  2. Non-Specific Active Suicidal Thoughts (Past 1 Month): Yes  3. Active Suicidal Ideation with any Methods (Not Plan) Without Intent to Act (Lifetime): Yes  3. Active Suicidal Ideation with any Methods (Not Plan) Without Intent to Act (Past 1 Month): Yes  4. Active Suicidal Ideation with Some Intent to Act, Without Specific Plan (Lifetime): Yes  4. Active Suicidal Ideation with Some Intent to Act, Without Specific Plan (Past 1 Month): Yes  5. Active Suicidal Ideation with Specific Plan and Intent (Lifetime): Yes  5. Active Suicidal Ideation with Specific Plan and Intent (Past 1 Month): Yes  Intensity of Ideation  Most Severe Ideation Rating (Lifetime): 5  Most Severe Ideation Rating (Past 1 Month): 5  Frequency (Lifetime): Once a week  Frequency (Past 1 Month): Daily or almost daily  Duration (Lifetime): 1-4 hours/a lot of time  Duration (Past 1 Month): 1-4 hours/a lot of time  Controllability (Lifetime): Unable to control thoughts  Controllability (Past 1 Month): Unable to control thoughts  Deterrents (Lifetime): Deterrents most likely did not stop you  Deterrents (Past 1 Month): Deterrents definitely did not stop you  Reasons for Ideation (Lifetime): Mostly to end or stop the pain (You couldn't go on living with the pain or how you were feeling)  Reasons for Ideation (Past 1 Month): Mostly to end or stop the pain (You couldn't go on living with the pain or how you were feeling)  Suicidal Behavior  Actual Attempt (Lifetime): Yes  Total Number of Actual Attempts (Lifetime): 2  Actual Attempt Description (Lifetime): ingestion  Actual Attempt (Past 3 Months): Yes  Total Number of Actual Attempts (Past 3  Months): 1  Actual Attempt Description (Past 3 Months): ingestion  Has subject engaged in non-suicidal self-injurious behavior? (Lifetime): No  Interrupted Attempts (Lifetime): No  Aborted or Self-Interrupted Attempt (Lifetime): No  Preparatory Acts or Behavior (Lifetime): No  C-SSRS Risk (Lifetime/Recent)  Calculated C-SSRS Risk Score (Lifetime/Recent): High Risk    [place] Suicide Severity Rating Scale Since Last Contact: n/a        Actual/Potential Lethality (Most Lethal Attempt)  Most Lethal Attempt Date: 06/03/23  Actual Lethality/Medical Damage Code (Most Lethal Attempt): Moderate physical damage, medical attention needed  Potential Lethality Code (Most Lethal Attempt): Behavior likely to result in death despite available medical care       Validity of evaluation is not impacted by presenting factors during interview.   Comments regarding subjective versus objective responses to [place] tool: n/a  Environmental or Psychosocial Events: loss of a loved one, challenging interpersonal relationships, impulsivity/recklessness and other life stressors  Chronic Risk Factors: history of suicide attempts (x2), history of psychiatric hospitalization and serious, persistent mental illness   Warning Signs: seeking access to means to hurt or kill self, talking or writing about death, dying, or suicide, hopelessness and no reason for living, no sense of purpose in life  Protective Factors: lives in a responsibly safe and stable environment and supportive ongoing medical and mental health care relationships  Interpretation of Risk Scoring, Risk Mitigation Interventions and Safety Plan:  High risk is valid. Attempted tonight via ingestion of 40-60 pills.        Does the patient have thoughts of harming others? No     Is the patient engaging in sexually inappropriate behavior?  no        Current Substance Abuse     Is there recent substance abuse? no     Was a urine drug screen or blood alcohol level obtained: Yes ordered not  collected       Mental Status Exam     Affect: Blunted   Appearance: Disheveled    Attention Span/Concentration: Inattentive  Eye Contact: Avoidant   Fund of Knowledge: Delayed    Language /Speech Content: Fluent   Language /Speech Volume: Normal    Language /Speech Rate/Productions: Minimally Responsive    Recent Memory: Poor   Remote Memory: Poor   Mood: Anxious    Orientation to Person: Yes    Orientation to Place: Yes   Orientation to Time of Day: Yes    Orientation to Date: Yes    Situation (Do they understand why they are here?): Yes    Psychomotor Behavior: Underactive    Thought Content: Hallucinations and Suicidal   Thought Form: Intact      History of commitment: No         Medication    Psychotropic medications: Yes. He has not been taking them some days per group home report. List unknown, group home will call hospital Monday with more medication info.     Medication changes made in the last two weeks: No       Current Care Team    Primary Care Provider: No  Psychiatrist: [facility]  Therapist: No  : Yes. Pt cannot recall name.      CTSS or ARMHS: No  ACT Team: No  Other: No      Diagnosis  Schizoaffective disorder, Bipolar type F25.0 - Primary, By history       Clinical Summary and Substantiation of Recommendations    Pt presents to ED for suicide attempt via ingestion. Pt took 40-60 unknown pills today as an attempt to end his life. Pt continues to feel suicidal and is having thoughts of wishing his attempt worked. Pt is responding to internal stimuli. He reports that he talks with God and God talks back. Group home reports pt has not been medication compliant. Medical admission required for severity of ingestion. Recommended pt transition to in patient mental health for safety and stabilization upon completion of medical admission.     Disposition    Recommended disposition: Medical Admission: due to ingestion        Reviewed case and recommendations with  attending provider. Attending Name: Dr. Fernando       Attending concurs with disposition: Yes       Patient and/or validated legal guardian concurs with disposition: Yes       Final disposition: Medical admission: due to ingestion .       Assessment Details    Patient interview started at: 12:10 am and completed at: 12:40 am.     Total duration spent on the patient case in minutes: 1.0 hrs      CPT code(s) utilized: 71124 - Psychotherapy for Crisis - 60 (30-74*) min       BALDO Arias, Psychotherapist Trainee, Psychotherapist  [facility] - Triage & Transition Services  Callback: [phone]

## 2023-06-04 NOTE — PLAN OF CARE
"Nursing Assessment:  VT: /72 (BP Location: Right arm, Patient Position: Semi-Messina's, Cuff Size: Adult Large)   Pulse 89   Temp 98.6  F (37  C) (Oral)   Resp 20   Ht 1.93 m (6' 3.98\")   Wt (!) 190.2 kg (419 lb 4.8 oz)   SpO2 99%   BMI 51.06 kg/m       Edema: bilateral lower extremity edema noted. Legs elevated    Diet: patient ate dinner, ate all the mac and cheese, mashed potato, chocolate coockie, and half herb bake chicken    Skin: patient showered 2x during shift    Mood/Behavior: patient intermittently talking to self, when asked about hallucinations, he said he talks to God and He is telling him that he is going to die. Writer reassured patient and informed him that staff will be with him and make sure he is safe while he is here    Patient Statements: \"God told me I was going to die\", \"My organs are shutting down\", \"I don't think I will make it\"    Additional Notes: patient having a hard time sleeping. Reported he feels tired but can't sleep. Family at bedside also spoke to writer and said the patient provided them with bank account information and password    Pain Management:  Denied pain    Nursing Plan:  Continue POC, SI precautions, 1:1 for safety    Discharge Disposition:  Pending, possible transfer to inpatient psych when medically stable  "

## 2023-06-04 NOTE — PLAN OF CARE
Serg Tejada  June 4, 2023  Plan of Care Hand-off Note     Patient Care Path: Inpatient Medical    Plan for Care:     Pt presents to ED for suicide attempt via ingestion. Pt took 40-60 unknown pills today as an attempt to end his life. Pt continues to feel suicidal and is having thoughts of wishing his attempt worked. Pt is responding to internal stimuli. He reports that he talks with God and God talks back. Group home reports pt has not been medication compliant. Medical admission required for severity of ingestion. Recommended pt transition to in patient mental health for safety and stabilization upon completion of medical admission.     Critical Safety Issues: attempted suicide via ingestion    Overview:  This patient is a child/adolescent: No    This patient has additional special visitor precautions: No    Legal Status: Voluntary    Contacts:   [phone] - [facility]     Psychiatry Consult:  Adult Psychiatry Consult requested related to unknown medication list, auditory hallucinations, delusions (speaking with God). Psychiatry IP Consult Order Placed: Yes    Updated RN and Attending Provider regarding plan of care.    BALDO Arias

## 2023-06-05 ENCOUNTER — TELEPHONE (OUTPATIENT)
Dept: BEHAVIORAL HEALTH | Facility: CLINIC | Age: 28
End: 2023-06-05
Payer: MEDICARE

## 2023-06-05 LAB
ALBUMIN SERPL BCG-MCNC: 4.2 G/DL (ref 3.5–5.2)
ALP SERPL-CCNC: 94 U/L (ref 40–129)
ALT SERPL W P-5'-P-CCNC: 42 U/L (ref 10–50)
ANION GAP SERPL CALCULATED.3IONS-SCNC: 12 MMOL/L (ref 7–15)
AST SERPL W P-5'-P-CCNC: 37 U/L (ref 10–50)
ATRIAL RATE - MUSE: 87 BPM
BASOPHILS # BLD AUTO: 0 10E3/UL (ref 0–0.2)
BASOPHILS NFR BLD AUTO: 0 %
BILIRUB SERPL-MCNC: 0.3 MG/DL
BUN SERPL-MCNC: 11 MG/DL (ref 6–20)
CALCIUM SERPL-MCNC: 9.1 MG/DL (ref 8.6–10)
CHLORIDE SERPL-SCNC: 105 MMOL/L (ref 98–107)
CREAT SERPL-MCNC: 0.72 MG/DL (ref 0.67–1.17)
DEPRECATED HCO3 PLAS-SCNC: 23 MMOL/L (ref 22–29)
DIASTOLIC BLOOD PRESSURE - MUSE: NORMAL MMHG
EOSINOPHIL # BLD AUTO: 0.3 10E3/UL (ref 0–0.7)
EOSINOPHIL NFR BLD AUTO: 4 %
ERYTHROCYTE [DISTWIDTH] IN BLOOD BY AUTOMATED COUNT: 12.9 % (ref 10–15)
GFR SERPL CREATININE-BSD FRML MDRD: >90 ML/MIN/1.73M2
GLUCOSE BLDC GLUCOMTR-MCNC: 106 MG/DL (ref 70–99)
GLUCOSE BLDC GLUCOMTR-MCNC: 109 MG/DL (ref 70–99)
GLUCOSE BLDC GLUCOMTR-MCNC: 114 MG/DL (ref 70–99)
GLUCOSE BLDC GLUCOMTR-MCNC: 118 MG/DL (ref 70–99)
GLUCOSE BLDC GLUCOMTR-MCNC: 89 MG/DL (ref 70–99)
GLUCOSE SERPL-MCNC: 106 MG/DL (ref 70–99)
HCT VFR BLD AUTO: 42.7 % (ref 40–53)
HGB BLD-MCNC: 13.6 G/DL (ref 13.3–17.7)
IMM GRANULOCYTES # BLD: 0 10E3/UL
IMM GRANULOCYTES NFR BLD: 0 %
INTERPRETATION ECG - MUSE: NORMAL
LYMPHOCYTES # BLD AUTO: 2.4 10E3/UL (ref 0.8–5.3)
LYMPHOCYTES NFR BLD AUTO: 32 %
MAGNESIUM SERPL-MCNC: 3.1 MG/DL (ref 1.7–2.3)
MCH RBC QN AUTO: 28.8 PG (ref 26.5–33)
MCHC RBC AUTO-ENTMCNC: 31.9 G/DL (ref 31.5–36.5)
MCV RBC AUTO: 90 FL (ref 78–100)
MONOCYTES # BLD AUTO: 0.4 10E3/UL (ref 0–1.3)
MONOCYTES NFR BLD AUTO: 5 %
NEUTROPHILS # BLD AUTO: 4.4 10E3/UL (ref 1.6–8.3)
NEUTROPHILS NFR BLD AUTO: 59 %
NRBC # BLD AUTO: 0 10E3/UL
NRBC BLD AUTO-RTO: 0 /100
P AXIS - MUSE: 53 DEGREES
PLATELET # BLD AUTO: 334 10E3/UL (ref 150–450)
POTASSIUM SERPL-SCNC: 4 MMOL/L (ref 3.4–5.3)
POTASSIUM SERPL-SCNC: 4.1 MMOL/L (ref 3.4–5.3)
PR INTERVAL - MUSE: 126 MS
PROT SERPL-MCNC: 7 G/DL (ref 6.4–8.3)
QRS DURATION - MUSE: 114 MS
QT - MUSE: 394 MS
QTC - MUSE: 474 MS
R AXIS - MUSE: 26 DEGREES
RBC # BLD AUTO: 4.73 10E6/UL (ref 4.4–5.9)
SODIUM SERPL-SCNC: 140 MMOL/L (ref 136–145)
SYSTOLIC BLOOD PRESSURE - MUSE: NORMAL MMHG
T AXIS - MUSE: 12 DEGREES
VENTRICULAR RATE- MUSE: 87 BPM
WBC # BLD AUTO: 7.6 10E3/UL (ref 4–11)

## 2023-06-05 PROCEDURE — 85025 COMPLETE CBC W/AUTO DIFF WBC: CPT | Performed by: INTERNAL MEDICINE

## 2023-06-05 PROCEDURE — 120N000002 HC R&B MED SURG/OB UMMC

## 2023-06-05 PROCEDURE — 99232 SBSQ HOSP IP/OBS MODERATE 35: CPT | Performed by: PSYCHIATRY & NEUROLOGY

## 2023-06-05 PROCEDURE — 99232 SBSQ HOSP IP/OBS MODERATE 35: CPT | Performed by: INTERNAL MEDICINE

## 2023-06-05 PROCEDURE — 250N000011 HC RX IP 250 OP 636: Performed by: INTERNAL MEDICINE

## 2023-06-05 PROCEDURE — 250N000013 HC RX MED GY IP 250 OP 250 PS 637: Performed by: INTERNAL MEDICINE

## 2023-06-05 PROCEDURE — 83735 ASSAY OF MAGNESIUM: CPT | Performed by: INTERNAL MEDICINE

## 2023-06-05 PROCEDURE — 84132 ASSAY OF SERUM POTASSIUM: CPT | Performed by: INTERNAL MEDICINE

## 2023-06-05 PROCEDURE — 80053 COMPREHEN METABOLIC PANEL: CPT | Performed by: INTERNAL MEDICINE

## 2023-06-05 PROCEDURE — 36415 COLL VENOUS BLD VENIPUNCTURE: CPT | Performed by: INTERNAL MEDICINE

## 2023-06-05 RX ORDER — OLANZAPINE 5 MG/1
5 TABLET ORAL EVERY MORNING
Status: ON HOLD | COMMUNITY
End: 2023-06-12

## 2023-06-05 RX ORDER — OLANZAPINE 5 MG/1
20 TABLET ORAL AT BEDTIME
Status: DISCONTINUED | OUTPATIENT
Start: 2023-06-05 | End: 2023-06-07 | Stop reason: HOSPADM

## 2023-06-05 RX ORDER — OLANZAPINE 20 MG/1
20 TABLET ORAL AT BEDTIME
Status: ON HOLD | COMMUNITY
End: 2023-06-12

## 2023-06-05 RX ORDER — FUROSEMIDE 10 MG/ML
40 INJECTION INTRAMUSCULAR; INTRAVENOUS ONCE
Status: COMPLETED | OUTPATIENT
Start: 2023-06-05 | End: 2023-06-05

## 2023-06-05 RX ORDER — OLANZAPINE 5 MG/1
5 TABLET ORAL EVERY MORNING
Status: DISCONTINUED | OUTPATIENT
Start: 2023-06-06 | End: 2023-06-07 | Stop reason: HOSPADM

## 2023-06-05 RX ADMIN — OLANZAPINE 20 MG: 5 TABLET, FILM COATED ORAL at 21:05

## 2023-06-05 RX ADMIN — FUROSEMIDE 40 MG: 10 INJECTION, SOLUTION INTRAMUSCULAR; INTRAVENOUS at 14:51

## 2023-06-05 ASSESSMENT — ACTIVITIES OF DAILY LIVING (ADL)
ADLS_ACUITY_SCORE: 18
ADLS_ACUITY_SCORE: 29
ADLS_ACUITY_SCORE: 18
ADLS_ACUITY_SCORE: 29
ADLS_ACUITY_SCORE: 29
ADLS_ACUITY_SCORE: 18
ADLS_ACUITY_SCORE: 18
ADLS_ACUITY_SCORE: 29
ADLS_ACUITY_SCORE: 18
ADLS_ACUITY_SCORE: 29

## 2023-06-05 NOTE — PROGRESS NOTES
"[facility]    Medicine Progress Note - Hospitalist Service, GOLD TEAM 16    Date of Admission:  6/3/2023    Assessment & Plan   Serg Tejada is a 28 year old male admitted on 6/3/2023. He is admitted for monitoring after intentional overdose of unknown medication and psychosis in the setting of medication non-adherance.     #Intentional overdose, unknown medication  Poison control contacted in the ED, primary concern is for ingestion of venlafaxine with risk of seizures and hypoglycemia. Monitor for 12-18 hours, if medically stable at this point, would be ready for transfer to inpatient psych.   - Discontinue every 2 hour glucose monitoring  - Can discontinue cardiac monitoring at this point  - Continue seizure precautions  - Continue bedside attendant pending inpatient psychiatry placement  - According to documentation, patient is still responding to internal stimuli  - Transfer order placed for inpatient psychiatry    #Schizophrenia, unspecified type  Group home reports that he has not been taking his medications. Given unknown medication, will hold psychiatric meds until medical stability established. Will need inpatient psych. Will use benzodiazepines if necessary for agitation, would avoid antipsychotics given unknown ingestion.   - Hold PTA Olanzapine 20 mg at bedtime, venlafaxine 150 mg daily        Diet: Advance Diet as Tolerated: Regular Diet Adult    DVT Prophylaxis: Ambulate every shift  Earl Catheter: Not present  Lines: None     Cardiac Monitoring: None  Code Status: Full Code      Clinically Significant Risk Factors               # Coagulation Defect: INR = 1.17 (Ref range: 0.85 - 1.15) and/or PTT = N/A, will monitor for bleeding           # Severe Obesity: Estimated body mass index is 51.06 kg/m  as calculated from the following:    Height as of this encounter: 1.93 m (6' 3.98\").    Weight as of this encounter: 190.2 kg (419 lb 4.8 oz)., PRESENT ON " ADMISSION          Disposition Plan     Expected Discharge Date: 06/06/2023      Destination: group home            Kashif DO Shahid, MHS  Hospitalist Service, GOLD TEAM 16  M [facility]  Securely message with [employer] (more info)  Text page via [employer] Paging/Directory   See signed in provider for up to date coverage information  ______________________________________________________________________    Interval History   Patient was in the shower during my first visit and once unable to talk.  On my second attempt to visit patient, he was deep and sleep.  Discussed case with nursing staff.  Nursing staff documentation reports ongoing response to internal stimuli.  Per nursing staff, no other acute issues or clinical concerns.    Physical Exam   Vital Signs: Temp: 98.7  F (37.1  C) Temp src: Oral BP: (!) 148/76 Pulse: 77   Resp: 20 SpO2: 96 % O2 Device: None (Room air)    Weight: 419 lbs 4.8 oz    GENERAL: Patient is laying supine; very obese; no acute distress.  HEENT: Normocephalic; atraumatic; PERRLA; MMM.  CV: RRR; normal S1, S2; no rubs, murmurs, or gallops.  RESP: Lung fields clear to aucultation B/L; no wheezing or crepitations.  GI: Abdomen is soft, nontender, nondistended; no organomegaly; normal bowel sounds.  : Deferred genital examination.   MSK: No clubbing, cyanosis, or edema.  DERM: Skin is intact; no rash, lesions, or skin breakdown.    Medical Decision Making       45 MINUTES SPENT BY ME on the date of service doing chart review, history, exam, documentation & further activities per the note.      Data     I have personally reviewed the following data over the past 24 hrs:    N/A  \   N/A   / N/A     140 105 8.3 /  118 (H)   4.1; 4.0 24 0.67 \       ALT: 45 AST: 38 AP: 88 TBILI: 0.6   ALB: 3.9 TOT PROTEIN: 6.8 LIPASE: N/A       Imaging results reviewed over the past 24 hrs:   No results found for this or any previous visit (from the past 24 hour(s)).

## 2023-06-05 NOTE — PLAN OF CARE
"End of shift Summary: See flowsheet for VS and detail assessments.     Changes this Shift:      Pulmonary: LS clear throughout all lobes, denies SOB, remains on RA.     Output: Continent of bowel and bladder. Voids spontaneously without difficulty. LBM 6/4.     Activity: Up ad jeremiah with 1:1     Skin: No concerns     Pain: Denies pain     Neuro/CMS: A&Ox4, CMS intact, denies n/t. Pt also denies any suicidal ideations.     Dressings/Drains: n/a     IV: R PIV present and saline locked.     Additional info: Pt requests to stay one more night before transferring to psych. Also states that he has to \"be somewhere\" on 6/10 and cannot be in psych then. Pt requested for provider to see him and speak with him, Dr. Key notified.      Later in shift pt began \"speaking to God\", pt began was on floor on knees praying loudly then speaking to himself in bed. When asked who he was speaking to he said it was God and that he has \"spoken to him for years\". Pt stated that God had not been telling him anything negative or to hurt himself.     Plan: Pt to transfer to inpatient psych when bed available.  "

## 2023-06-05 NOTE — TELEPHONE ENCOUNTER
S: [facility] , Charge Nurse Naima from [facility]  [phone] calling at 10:20am about a 28 year old/Male presenting with SI from and overdose.       B: Pt arrived via EMS. Presenting problem, stressors: Pt was on medical Secondary to an overdose of multiple Medications.   Pt endorses SI and reports passing of his mother 3 weeks ago.  Pt is dion for safety in the hospital.  Pt states God is telling him to die, he is having a difficult time sleeping.  Pt does live in a group in [place], [place].      Pt affect in ED: Depressed  Pt Dx: Bipolar Disorder and Schizoaffective Disorder  Previous IPMH hx? Yes, 1 year ago for psychosis.    Pt endorses SI with a plan to overdose again if d/molly   Hx of suicide attempt? No  Pt denies SIB  Pt denies HI   Pt endorses auditory hallucinations .   God is telling Pt to die  Pt RARS Score: 3    Hx of aggression/violence, sexual offenses, legal concerns, Epic care plan? describe: None    Current concerns for aggression this visit? No  Does pt have a history of Civil Commitment? No  Is Pt their own guardian? Yes    Ptprescribed medication. Is patient medication compliant? N/A  Pt endorses OP services: Psychiatrist and   CD concerns: None  Acute or chronic medical concerns: None  Does Pt present with specific needs, assistive devices, or exclusionary criteria? None      Pt is ambulatory  Pt is able to perform ADLs independently      A: Pt to be reviewed for [facility] admission. Pt is Voluntary  Preferred placement: [facility] +1    COVID:Negative  Utox: Negative   CMP: WNL  CBC: WNL  HCG: N/A    R: Patient cleared and ready for behavioral bed placement: Yes  Pt placed on IP worklist? Yes   [facility]   [facility]  Pt remains on worklist

## 2023-06-05 NOTE — PLAN OF CARE
"BP (!) 148/76 (BP Location: Right arm, Cuff Size: Adult Regular)   Pulse 96   Temp 98.1  F (36.7  C) (Oral)   Resp 20   Ht 1.93 m (6' 3.98\")   Wt (!) 190.2 kg (419 lb 4.8 oz)   SpO2 96%   BMI 51.06 kg/m      A&Ox4. Bedside attendant present throughout shift. Appears to be responding to internal stimuli throughout shift. Endorses auditory and visual hallucinations. Pt inquiring about discharge planning this shift. RN told patient no decisions have been made at this time. To this pt replied, \"I am still feeling week and think I need to stay here for a few more days\". Independent with supervision. Took 4 showers this shift. Slept well overnight. No acute changes at this time.  Nakita Livingston RN on 6/5/2023 at 6:08 AM      "

## 2023-06-05 NOTE — PHARMACY-ADMISSION MEDICATION HISTORY
Pharmacy Intern Admission Medication History    Admission medication history is complete. The information provided in this note is only as accurate as the sources available at the time of the update.    Medication reconciliation/reorder completed by provider prior to medication history? Yes    Information Source(s): Facility ([facility] medication list/MAR and dispense report via phone    Pertinent Information: Received MAR orally from group home. Patient is ~80% compliant with taking his medications (per ).    Changes made to PTA medication list:    Added: Olanzapine 20 mg tablet, take 1 tablet PO at bedtime (per  and dispense report)  o Olanzapine 5 mg tablet, take 1 tablet PO in the morning (per )  o Venlafaxine (XR) 150 mg 24hr capsule, take 1 capsule PO in the morning (per  and dispense report)    Deleted: None    Changed: None    Medication Affordability: did not discuss    Allergies reviewed with patient and updates made in EHR: no    Medication History Completed By: Lemuel Urbina 6/5/2023 11:46 AM    Prior to Admission medications    Medication Sig Last Dose Taking? Auth Provider Long Term End Date   OLANZapine (ZYPREXA) 20 MG tablet Take 20 mg by mouth At Bedtime 6/2/2023 Yes Unknown, Entered By History No    OLANZapine (ZYPREXA) 5 MG tablet Take 5 mg by mouth every morning 6/3/2023 Yes Unknown, Entered By History No    venlafaxine (EFFEXOR XR) 150 MG 24 hr capsule Take 150 mg by mouth every morning 6/3/2023 Yes Unknown, Entered By History Yes

## 2023-06-05 NOTE — CONSULTS
Consult Date: 06/05/2023    PSYCHIATRIC CONSULTATION    IDENTIFICATION:  The patient is a 28-year-old white male who carries a diagnosis of undifferentiated schizophrenia.  I am asked to provide psychiatric followup by Dr. Key.    Prior to interviewing this patient, I had an opportunity to review the initial psychiatric consultation by Tanya Padilla.  That note suggests that the patient has been increasingly psychotic at his group home and that he overdosed on either olanzapine or venlafaxine or perhaps both. He came to the emergency department, has noted swelling of his extremities.  He is now on IV Lasix.  His olanzapine has been held.  I discussed this with Dr. Key and we decided we could go ahead and restart his regular dose of olanzapine.  I note that his QTc is 470 and usually we will use olanzapine up until a QTc of 500. We will get an EKG in a couple of days to make sure his QTc remains reasonable.  I had an opportunity to interview his 1:1 who says that the patient has been sleeping on and off, but often talking to himself.  When awake, he will suddenly clap and start grabbing at things.  This does not seem to have decreased.  On interviewing the patient, he reports he is here because God wanted him to get help in the hospital and he wanted him to overdose in order to get help in the emergency room. He reports that he talks to God daily and he often hears back from God. Typically this has not been a problem, but now that he is actually responding to command hallucinations and making suicide attempts, I do think this psychosis does need to be dealt with. Will certainly restart his antipsychotic medications.    This patient is still voluntary, but he would like to go to his sister's graduation party.  I told him this may or may not be possible. It will depend on his clinical improvement, but likely he will still need inpatient psychiatric stay for stabilization.  Hopefully, he will get much better on his  medication regimen.    MENTAL STATUS EXAM:  On my interview, the patient was pleasant and cooperative.  His mood was somewhat dysphoric and he is experiencing bereavement from the death of his mother some 2 or 3 weeks ago.  His speech is coherent and goal oriented.  Associations tight.  Thought processes are generally logical.  Content of thought includes auditory hallucinations.  Recent and remote memory, concentration, fund of knowledge and use of language appear to be at baseline, as does muscle strength and tone.  He does have some swelling of the extremities.  His insight and judgment are somewhat guarded.  Recent vital signs include a blood pressure of 140/76, temperature of 98.7, pulse of 77, respiration rate of 20 with 96% oxygen saturation.    IMPRESSION:  Chronic undifferentiated schizophrenia.    RECOMMENDATIONS:  Restart home medications.  Monitor EKG for potential QTc prolongation.  Please request a psychiatric followup in the next couple of days to assess the continued need for inpatient psychiatry.    Mikal Trammell MD        D: 2023   T: 2023   MT: gunner    Name:     GAL MALDONADO  MRN:      [id number]        Account:      [id number]   :      1995           Consult Date: 2023     Document: [id number]    cc:  Kashif Key DO

## 2023-06-05 NOTE — TELEPHONE ENCOUNTER
R: [place]  Access Inpatient Bed Call Log 6/5/2023 4:30PM     Adults:      [facility] is at capacity.  [facility] is posting 0 beds.    [facility] is posting 0 beds. Negative covid required.     [facility] is posting 0 beds. Negative covid required.     [facility] are posting 0 beds.     [facility] has 0 beds.Extensive wait List for committed patients.    [facility] [place] posted 1 beds.  [phone] [facility] is posting 0 bed.     [facility] is posting 4 beds -LOW acuity ONLY. Mixed unit 12+. Negative covid-   [phone]  [facility] has 0 bed. No aggression.  Negative Covid.    [facility] is posting 0 beds.  Negative covid.     [facility] 3 beds Low acuity only.  Negative covid. -   [phone]  [facility] is posting 0 beds.  Low acuity only.  No aggression.  [phone]  [facility] is posting 2 beds. Low acuity. 72 HH hold preferred. - [phone].  Negative covid required.     [facility] [place] is posting 7 Beds -     Negative covid required.   Vol only, No history of aggression, violence, or assault. No sexual offenders. No 72 HH holds.   [phone]  [facility] is posting 6 beds. Negative covid required.  (Must have the cognitive ability to do programming. No aggressive or violent behavior or recent HX in the last 2 yrs. MH must be primary.) Always low acuity.  [phone]  [facility] has 0 beds. Negative covid required.  Low acuity only. Violence and aggression capped.    [phone]  [facility] is posting possible 1 beds. Low acuity, Negative covid required.   [phone]  [facility] is posting 3 beds. Negative covid required.  Vol only. Combined adolescent and adult unit. No aggressive or violent behavior. No registered sex offenders.   [phone]  [facility] posting 2 beds. Negative covid  required.   [phone]  -At capacity.  [facility] is positing 2 beds.  Negative covid. (No lines, drains, or tubes, oxygen, CPAP, IV, etc.).     [facility] posting 5 beds. Negative covid. (No lines, drains, or tubes, oxygen, CPAP, IV, etc.).   [facility] is posting 6 beds. No covid test required.  [phone]           Pt remains on waitlist pending appropriate bed availability.

## 2023-06-06 ENCOUNTER — TELEPHONE (OUTPATIENT)
Dept: BEHAVIORAL HEALTH | Facility: CLINIC | Age: 28
End: 2023-06-06
Payer: MEDICARE

## 2023-06-06 VITALS
OXYGEN SATURATION: 95 % | TEMPERATURE: 97.7 F | HEART RATE: 104 BPM | RESPIRATION RATE: 20 BRPM | SYSTOLIC BLOOD PRESSURE: 136 MMHG | HEIGHT: 76 IN | DIASTOLIC BLOOD PRESSURE: 75 MMHG | BODY MASS INDEX: 38.36 KG/M2 | WEIGHT: 315 LBS

## 2023-06-06 LAB
ALBUMIN SERPL BCG-MCNC: 4 G/DL (ref 3.5–5.2)
ALP SERPL-CCNC: 104 U/L (ref 40–129)
ALT SERPL W P-5'-P-CCNC: 41 U/L (ref 10–50)
ANION GAP SERPL CALCULATED.3IONS-SCNC: 11 MMOL/L (ref 7–15)
AST SERPL W P-5'-P-CCNC: 26 U/L (ref 10–50)
BASOPHILS # BLD AUTO: 0.1 10E3/UL (ref 0–0.2)
BASOPHILS NFR BLD AUTO: 1 %
BILIRUB SERPL-MCNC: 0.3 MG/DL
BUN SERPL-MCNC: 14.7 MG/DL (ref 6–20)
CALCIUM SERPL-MCNC: 9.5 MG/DL (ref 8.6–10)
CHLORIDE SERPL-SCNC: 106 MMOL/L (ref 98–107)
CREAT SERPL-MCNC: 0.77 MG/DL (ref 0.67–1.17)
DEPRECATED HCO3 PLAS-SCNC: 24 MMOL/L (ref 22–29)
EOSINOPHIL # BLD AUTO: 0.4 10E3/UL (ref 0–0.7)
EOSINOPHIL NFR BLD AUTO: 4 %
ERYTHROCYTE [DISTWIDTH] IN BLOOD BY AUTOMATED COUNT: 13.1 % (ref 10–15)
GFR SERPL CREATININE-BSD FRML MDRD: >90 ML/MIN/1.73M2
GLUCOSE SERPL-MCNC: 120 MG/DL (ref 70–99)
HBA1C MFR BLD: 5.8 %
HCT VFR BLD AUTO: 43.5 % (ref 40–53)
HGB BLD-MCNC: 14.1 G/DL (ref 13.3–17.7)
IMM GRANULOCYTES # BLD: 0 10E3/UL
IMM GRANULOCYTES NFR BLD: 0 %
LYMPHOCYTES # BLD AUTO: 3.5 10E3/UL (ref 0.8–5.3)
LYMPHOCYTES NFR BLD AUTO: 38 %
MAGNESIUM SERPL-MCNC: 2.4 MG/DL (ref 1.7–2.3)
MCH RBC QN AUTO: 28.4 PG (ref 26.5–33)
MCHC RBC AUTO-ENTMCNC: 32.4 G/DL (ref 31.5–36.5)
MCV RBC AUTO: 88 FL (ref 78–100)
MONOCYTES # BLD AUTO: 1 10E3/UL (ref 0–1.3)
MONOCYTES NFR BLD AUTO: 10 %
NEUTROPHILS # BLD AUTO: 4.3 10E3/UL (ref 1.6–8.3)
NEUTROPHILS NFR BLD AUTO: 47 %
NRBC # BLD AUTO: 0 10E3/UL
NRBC BLD AUTO-RTO: 0 /100
PLATELET # BLD AUTO: 357 10E3/UL (ref 150–450)
POTASSIUM SERPL-SCNC: 4.2 MMOL/L (ref 3.4–5.3)
PROT SERPL-MCNC: 6.9 G/DL (ref 6.4–8.3)
RBC # BLD AUTO: 4.96 10E6/UL (ref 4.4–5.9)
SODIUM SERPL-SCNC: 141 MMOL/L (ref 136–145)
WBC # BLD AUTO: 9.2 10E3/UL (ref 4–11)

## 2023-06-06 PROCEDURE — 83036 HEMOGLOBIN GLYCOSYLATED A1C: CPT | Performed by: INTERNAL MEDICINE

## 2023-06-06 PROCEDURE — 250N000013 HC RX MED GY IP 250 OP 250 PS 637: Performed by: INTERNAL MEDICINE

## 2023-06-06 PROCEDURE — 83735 ASSAY OF MAGNESIUM: CPT | Performed by: INTERNAL MEDICINE

## 2023-06-06 PROCEDURE — 85004 AUTOMATED DIFF WBC COUNT: CPT | Performed by: INTERNAL MEDICINE

## 2023-06-06 PROCEDURE — 99232 SBSQ HOSP IP/OBS MODERATE 35: CPT | Performed by: INTERNAL MEDICINE

## 2023-06-06 PROCEDURE — 120N000002 HC R&B MED SURG/OB UMMC

## 2023-06-06 PROCEDURE — 36415 COLL VENOUS BLD VENIPUNCTURE: CPT | Performed by: INTERNAL MEDICINE

## 2023-06-06 PROCEDURE — 80053 COMPREHEN METABOLIC PANEL: CPT | Performed by: INTERNAL MEDICINE

## 2023-06-06 RX ADMIN — OLANZAPINE 5 MG: 5 TABLET, FILM COATED ORAL at 07:57

## 2023-06-06 RX ADMIN — OLANZAPINE 20 MG: 5 TABLET, FILM COATED ORAL at 21:03

## 2023-06-06 ASSESSMENT — ACTIVITIES OF DAILY LIVING (ADL)
ADLS_ACUITY_SCORE: 18

## 2023-06-06 NOTE — TELEPHONE ENCOUNTER
0433 Per hand off, [facility] is willing to review.  Clinical faxed at 0437.  Awaiting fax receipt confirmation.    0516 Fax receipt confirmation received.  5 Med Surg RN notified at 0521.  Awaiting CB from [place].    0700 Intake is awaiting a call back from [facility].  Case has been passed to days to track.

## 2023-06-06 NOTE — TELEPHONE ENCOUNTER
R: 6:08pm-[facility] d/t their acute unit.  Call back tomorrow if still looking for placement.     No approp bed at this time.  Pt remains on waitlist pending available bed.

## 2023-06-06 NOTE — PROGRESS NOTES
"[facility]    Medicine Progress Note - Hospitalist Service, GOLD TEAM 16    Date of Admission:  6/3/2023    Assessment & Plan      Serg Tejada is a 28 year old male admitted on 6/3/2023. He is admitted for monitoring after intentional overdose of unknown medication and psychosis in the setting of medication non-adherance.      #Intentional overdose, unknown medication  Poison control contacted in the ED, primary concern is for ingestion of venlafaxine with risk of seizures and hypoglycemia.  - blood sugar have been stable , no evidence of hypoglycemia so of glucose monitoring, hemodynamically remained stable   - patient  Denies any suicidal ideations to me,  He does not know what medications he took , per notes he took 40-60 pills that he found in his old backpack  In attempt to commit suicide . He recently lost hi smother per ED notes   - tox screen was + for cannabinoids   - LFT remained normal   - can stop seizure precautions  - Continue bedside attendant        #Schizophrenia, chronic undifferentiated   Group home reports that he has not been taking his medications. Given unknown medication,psychiatric medications held    - seen by psychytary , recommended to restart home medications ,back on zyprexa 20 mg at night and 5 in AM  Not back on effexor   - monitor EKG for qTC , check EKG 6/7 . EKG 6/3 qTC 474   - he hear voices, this seems to be chronic     Per psych:   \" This patient is still voluntary, but he would like to go to his sister's graduation party.  I told him this may or may not be possible. It will depend on his clinical improvement, but likely he will still need inpatient psychiatric stay for stabilization.  Hopefully, he will get much better on his medication regimen\"     - patient  Want to leave by 6/10 for sister's graduation, asked psychiatry to resee and see if needs any medications adjustment and to see if patient  Needs inpatient psych " "treatment/hold        hyperglycemia  - no history of DM, checking HgA1c        Diet: Advance Diet as Tolerated: Regular Diet Adult    DVT Prophylaxis: ambulate   Earl Catheter: Not present  Lines: None     Cardiac Monitoring: None  Code Status: Full Code      Clinically Significant Risk Factors                         # Severe Obesity: Estimated body mass index is 51.06 kg/m  as calculated from the following:    Height as of this encounter: 1.93 m (6' 3.98\").    Weight as of this encounter: 190.2 kg (419 lb 4.8 oz)., PRESENT ON ADMISSION          Disposition Plan      TBD        Katie Verduzco MD  Hospitalist Service, GOLD TEAM 16  M [facility]  Securely message with [employer] (more info)  Text page via [employer] Paging/Directory   See signed in provider for up to date coverage information  ______________________________________________________________________    Interval History   In bed, doing ok, he could  not tell me what medications he took and why, no chest pain  No shortness of breath  No nausea vomiting  He states he wants to get out of here by 6/10 for his sisters graduation     Physical Exam   Vital Signs: Temp: 98.7  F (37.1  C) Temp src: Oral BP: 117/59 Pulse: 79   Resp: 20 SpO2: 94 % O2 Device: None (Room air)    Weight: 419 lbs 4.8 oz  General appearance: no apparent distress       NECK: supple  RESPIRATORY: lungs are clear   CARDIOVASCULAR: normal S1S2 regular rate and rhythm,  GASTROINTESTINAL: abdomen is  soft,  non-distended, non-tender with normal bowel sounds.  MUSCULOSKELETAL: normal muscle bulk and tone  SKIN: warm and dry, no rashes, no mottling noted   NEUROLOGIC: awake alert and oriented, speech clear    EXTREMITIES: no clubbing cyanosis or edema noted  moves all extremities     Data     I have personally reviewed the following data over the past 24 hrs:    9.2  \   14.1   / 357     141 106 14.7 /  120 (H)   4.2 24 0.77 \       ALT: 41 AST: 26 AP: 104 " TBILI: 0.3   ALB: 4.0 TOT PROTEIN: 6.9 LIPASE: N/A       Imaging results reviewed over the past 24 hrs:   No results found for this or any previous visit (from the past 24 hour(s)).  Recent Labs   Lab 06/06/23  0634 06/05/23  1110 06/05/23  1005 06/05/23  0818 06/05/23  0732 06/04/23  1633 06/04/23  1304 06/04/23  0050 06/03/23  2341   WBC 9.2 7.6  --   --   --   --   --   --  9.7   HGB 14.1 13.6  --   --   --   --   --   --  12.6*   MCV 88 90  --   --   --   --   --   --  88    334  --   --   --   --   --   --  319   INR  --   --   --   --   --   --   --   --  1.17*     --   --   --  140  --  137  --  138   POTASSIUM 4.2  --   --   --  4.0  4.1  --  3.8  --  3.6   CHLORIDE 106  --   --   --  105  --  103  --  104   CO2 24  --   --   --  23  --  24  --  25   BUN 14.7  --   --   --  11.0  --  8.3  --  10.2   CR 0.77  --   --   --  0.72  --  0.67  --  0.78   ANIONGAP 11  --   --   --  12  --  10  --  9   WINNIE 9.5  --   --   --  9.1  --  9.1  --  8.9   *  --  118* 114* 106*   < > 115*   < > 93   ALBUMIN 4.0  --   --   --  4.2  --  3.9  --  3.8   PROTTOTAL 6.9  --   --   --  7.0  --  6.8  --  6.5   BILITOTAL 0.3  --   --   --  0.3  --  0.6  --  0.4   ALKPHOS 104  --   --   --  94  --  88  --  85   ALT 41  --   --   --  42  --  45  --  43   AST 26  --   --   --  37  --  38  --  41   LIPASE  --   --   --   --   --   --   --   --  21    < > = values in this interval not displayed.

## 2023-06-06 NOTE — TELEPHONE ENCOUNTER
11:02 AM: Pt was declined d/t Pt acuity in current milieu.    11:52 AM: Intake called [facility] to present pt for review at [facility] in [place].     12:58 PM: Intake faxed [place] 2 page form, labs, and ED notes.    2:00 PM: Intake called [facility] to confirm fax went through and was informed that face sheet was missing. Face sheet has now been faxed.    3:13 PM: Intake called [facility] to check on status of review. Review currently pending. [facility] to call back when finished reviewing.        [facility] Access Inpatient Bed Call Log 6/6/23 @8:35 AM     Intake has called facilities that have not updated their bed status within the last 12 hours.          Adults:           [facility] is posting 0 beds.        [facility] is posting 0 beds. [phone]     [facility] is posting 0 beds. [phone]       [facility] is posting 0 beds. [phone]     [facility] is posting 0 beds. [phone]       [facility] is posting 0 beds. [phone]       [facility] is posting 0 beds. [phone]       [facility] is posting 0 beds. [phone]       [facility] is posting 1 bed. [phone]         [facility] is posting 0 beds. [phone]         [facility] is posting 2 beds. Mixed unit 12+. Low acuity only. [phone] Per website @7:27am    [facility] is positing 0 beds. No aggression.  [phone]       [facility] is posting 0 beds. [phone]      [facility] is posting 2 beds. Low acuity only. [phone]      [facility] is posting 0 beds. Low acuity. No current aggression. [phone]      [facility] is posting 0 beds. Low acuity. [phone]      [facility] is posting 0 beds. 72 HH preferred. [phone].     [facility] is posting 3 beds. Low acuity. [phone] Per website @4:30am    [facility] is posting 7 beds. Vol only, No Hx of aggression, violence, or assault. No sexual offenders. No 72  hr holds.          Pt remains on work list pending appropriate bed availability.

## 2023-06-06 NOTE — PLAN OF CARE
"End of shift Summary: See flowsheet for VS and detail assessments.     Changes this Shift:      Pulmonary: LS clear throughout all lobes, denies SOB, no cough noted. Remains on RA.     Output: Continent of bowel and bladder.     Activity: Up ad jeremiah w/ 1:1 for safety.     Skin: No concerns.     Pain: Denies     Neuro/CMS: A&Ox4, more lethargic this shift, CMS intact, denies n/t.     Dressings/Drains:  n/a     IV: R PIV saline locked.    Additional info: Still actively hearing auditory hallucinations. States it is \"God\" speaking to him. Pt responds frequently to internal stimuli.     Plan: Awaiting psychiatric bed to become available for transfer. Unknown discharge date.       "

## 2023-06-06 NOTE — PLAN OF CARE
" Blood pressure 117/59, pulse 79, temperature 98.7  F (37.1  C), temperature source Oral, resp. rate 20, height 1.93 m (6' 3.98\"), weight (!) 190.2 kg (419 lb 4.8 oz), SpO2 94 %.      Major shift changes: See flowsheet for VS and detail assessments      Respiratory/cardiac: Clear, denies SOB or chest pain. RA.       Neuro: A/O x4, no N/T. Pt denies SI. Pt has auditory hallucination \"Stating I speak with god\". Mentions god is not telling him anything negative.       Pain:  No pain.       Activity: Independents in room. Sitter present for safety. Dependent edema noted.        GI/: Continent of bowel and bladder.       Skin: Intact      R PIV SL       Plan: Pending for placement inpatient psych                     "

## 2023-06-06 NOTE — TELEPHONE ENCOUNTER
[facility] Access Inpatient Bed Call Log 6/6/2023 12:57 AM      Intake has called facilities that have not updated their bed status within the last 12 hours.     Adults:         [facility] is posting 0 beds.      [facility] is posting 0 beds. [phone]      [facility] is posting 0 beds. [phone]     [facility] is posting 0 beds. [phone] - 1:02am Per [place] they are capped.     [facility] is posting 0 beds. [phone]     [facility] is posting 0 bed. [phone]      [facility] is posting 0 beds. [phone]     [facility] is posting 0 beds. [phone]     [facility] is posting 1 beds. [phone]     [facility] is posting 0 beds. [phone]       [facility] is posting 2 beds. Mixed unit 12+. Low acuity only.  [phone]     [facility] is posting 0 beds. No aggression.  [phone]     [facility] is posting 0 beds. [phone]      [facility] is posting 0 beds. [phone]      [facility] is posting 0 bed. [phone]      [facility] is posting 0 beds. Low acuity. [phone]     [facility] is posting 0 beds. 72 hr hold preferred. [phone]     [facility] is posting 3 bed.  [phone]        [facility] is posting 7 bed. Voluntary only- no holds/commitments, No Hx of aggression, violence, or assault. No sexual offenders. No 72 hr holds. [phone]     [facility] is posting 6 beds. (Must have the cognitive ability to do programming. No aggressive or violent behavior or recent HX in the last 2 yrs. MH must be primary.) [phone]    [facility] is posting 2 beds. Low acuity only. Violence and aggression capped. [phone]      [facility] is posting 1 bed. Low acuity, Neg Covid. [phone]     [facility] is posting 2 beds. Covid neg. Vol only. Combined adolescent and adult unit. No aggressive or violent behavior. No  registered sex offenders. [phone] - 1;00am Per [place], call back after 8am.    [facility] posting 0 beds. Sharri covid.      [facility] is posting 6 beds. Call for details. [phone]      [facility] is posting 5 beds. [phone]       Pt remains on work list pending appropriate bed availability.

## 2023-06-07 ENCOUNTER — HOSPITAL ENCOUNTER (INPATIENT)
Facility: CLINIC | Age: 28
LOS: 5 days | Discharge: HOME | DRG: 885 | End: 2023-06-12
Attending: PSYCHIATRY & NEUROLOGY | Admitting: PSYCHIATRY & NEUROLOGY
Payer: MEDICARE

## 2023-06-07 ENCOUNTER — MEDICAL CORRESPONDENCE (OUTPATIENT)
Dept: HEALTH INFORMATION MANAGEMENT | Facility: CLINIC | Age: 28
End: 2023-06-07
Payer: MEDICARE

## 2023-06-07 DIAGNOSIS — F20.3 UNDIFFERENTIATED SCHIZOPHRENIA (H): Primary | ICD-10-CM

## 2023-06-07 LAB
ALBUMIN SERPL BCG-MCNC: 4.1 G/DL (ref 3.5–5.2)
ALP SERPL-CCNC: 93 U/L (ref 40–129)
ALT SERPL W P-5'-P-CCNC: 43 U/L (ref 10–50)
ANION GAP SERPL CALCULATED.3IONS-SCNC: 11 MMOL/L (ref 7–15)
AST SERPL W P-5'-P-CCNC: 28 U/L (ref 10–50)
ATRIAL RATE - MUSE: 91 BPM
BASOPHILS # BLD AUTO: 0.1 10E3/UL (ref 0–0.2)
BASOPHILS NFR BLD AUTO: 1 %
BILIRUB SERPL-MCNC: 0.5 MG/DL
BUN SERPL-MCNC: 13.6 MG/DL (ref 6–20)
CALCIUM SERPL-MCNC: 9.3 MG/DL (ref 8.6–10)
CHLORIDE SERPL-SCNC: 104 MMOL/L (ref 98–107)
CHOLEST SERPL-MCNC: 163 MG/DL
CREAT SERPL-MCNC: 0.7 MG/DL (ref 0.67–1.17)
DEPRECATED HCO3 PLAS-SCNC: 25 MMOL/L (ref 22–29)
DIASTOLIC BLOOD PRESSURE - MUSE: NORMAL MMHG
EOSINOPHIL # BLD AUTO: 0.3 10E3/UL (ref 0–0.7)
EOSINOPHIL NFR BLD AUTO: 4 %
ERYTHROCYTE [DISTWIDTH] IN BLOOD BY AUTOMATED COUNT: 13.2 % (ref 10–15)
GFR SERPL CREATININE-BSD FRML MDRD: >90 ML/MIN/1.73M2
GLUCOSE SERPL-MCNC: 102 MG/DL (ref 70–99)
HCT VFR BLD AUTO: 46 % (ref 40–53)
HDLC SERPL-MCNC: 37 MG/DL
HGB BLD-MCNC: 14.8 G/DL (ref 13.3–17.7)
IMM GRANULOCYTES # BLD: 0.1 10E3/UL
IMM GRANULOCYTES NFR BLD: 1 %
INTERPRETATION ECG - MUSE: NORMAL
LDLC SERPL CALC-MCNC: 103 MG/DL
LYMPHOCYTES # BLD AUTO: 3.5 10E3/UL (ref 0.8–5.3)
LYMPHOCYTES NFR BLD AUTO: 42 %
MCH RBC QN AUTO: 28.5 PG (ref 26.5–33)
MCHC RBC AUTO-ENTMCNC: 32.2 G/DL (ref 31.5–36.5)
MCV RBC AUTO: 89 FL (ref 78–100)
MONOCYTES # BLD AUTO: 0.7 10E3/UL (ref 0–1.3)
MONOCYTES NFR BLD AUTO: 9 %
NEUTROPHILS # BLD AUTO: 3.8 10E3/UL (ref 1.6–8.3)
NEUTROPHILS NFR BLD AUTO: 43 %
NONHDLC SERPL-MCNC: 126 MG/DL
NRBC # BLD AUTO: 0 10E3/UL
NRBC BLD AUTO-RTO: 0 /100
P AXIS - MUSE: 25 DEGREES
PLATELET # BLD AUTO: 357 10E3/UL (ref 150–450)
POTASSIUM SERPL-SCNC: 4 MMOL/L (ref 3.4–5.3)
PR INTERVAL - MUSE: 124 MS
PROT SERPL-MCNC: 6.5 G/DL (ref 6.4–8.3)
QRS DURATION - MUSE: 100 MS
QT - MUSE: 372 MS
QTC - MUSE: 457 MS
R AXIS - MUSE: 32 DEGREES
RBC # BLD AUTO: 5.2 10E6/UL (ref 4.4–5.9)
SODIUM SERPL-SCNC: 140 MMOL/L (ref 136–145)
SYSTOLIC BLOOD PRESSURE - MUSE: NORMAL MMHG
T AXIS - MUSE: 24 DEGREES
TRIGL SERPL-MCNC: 114 MG/DL
TSH SERPL DL<=0.005 MIU/L-ACNC: 3.92 UIU/ML (ref 0.3–4.2)
VENTRICULAR RATE- MUSE: 91 BPM
WBC # BLD AUTO: 8.4 10E3/UL (ref 4–11)

## 2023-06-07 PROCEDURE — 128N000002 HC R&B CD/MH ADOLESCENT

## 2023-06-07 PROCEDURE — 80061 LIPID PANEL: CPT | Performed by: CLINICAL NURSE SPECIALIST

## 2023-06-07 PROCEDURE — 93010 ELECTROCARDIOGRAM REPORT: CPT | Performed by: INTERNAL MEDICINE

## 2023-06-07 PROCEDURE — 36415 COLL VENOUS BLD VENIPUNCTURE: CPT | Performed by: INTERNAL MEDICINE

## 2023-06-07 PROCEDURE — 250N000013 HC RX MED GY IP 250 OP 250 PS 637: Performed by: INTERNAL MEDICINE

## 2023-06-07 PROCEDURE — 85004 AUTOMATED DIFF WBC COUNT: CPT | Performed by: INTERNAL MEDICINE

## 2023-06-07 PROCEDURE — 99239 HOSP IP/OBS DSCHRG MGMT >30: CPT | Performed by: INTERNAL MEDICINE

## 2023-06-07 PROCEDURE — 93005 ELECTROCARDIOGRAM TRACING: CPT

## 2023-06-07 PROCEDURE — 99223 1ST HOSP IP/OBS HIGH 75: CPT | Mod: AI | Performed by: CLINICAL NURSE SPECIALIST

## 2023-06-07 PROCEDURE — 80053 COMPREHEN METABOLIC PANEL: CPT | Performed by: INTERNAL MEDICINE

## 2023-06-07 PROCEDURE — 99233 SBSQ HOSP IP/OBS HIGH 50: CPT

## 2023-06-07 PROCEDURE — 84443 ASSAY THYROID STIM HORMONE: CPT | Performed by: CLINICAL NURSE SPECIALIST

## 2023-06-07 PROCEDURE — 250N000013 HC RX MED GY IP 250 OP 250 PS 637: Performed by: CLINICAL NURSE SPECIALIST

## 2023-06-07 RX ORDER — ACETAMINOPHEN 325 MG/1
650 TABLET ORAL EVERY 4 HOURS PRN
Status: CANCELLED | OUTPATIENT
Start: 2023-06-07

## 2023-06-07 RX ORDER — TRAZODONE HYDROCHLORIDE 50 MG/1
50 TABLET, FILM COATED ORAL
Status: DISCONTINUED | OUTPATIENT
Start: 2023-06-07 | End: 2023-06-12 | Stop reason: HOSPADM

## 2023-06-07 RX ORDER — HYDROXYZINE HYDROCHLORIDE 25 MG/1
25 TABLET, FILM COATED ORAL EVERY 4 HOURS PRN
Status: DISCONTINUED | OUTPATIENT
Start: 2023-06-07 | End: 2023-06-07

## 2023-06-07 RX ORDER — OLANZAPINE 5 MG/1
5-10 TABLET ORAL 3 TIMES DAILY PRN
Status: DISCONTINUED | OUTPATIENT
Start: 2023-06-07 | End: 2023-06-10

## 2023-06-07 RX ORDER — AMOXICILLIN 250 MG
1 CAPSULE ORAL 2 TIMES DAILY PRN
Status: DISCONTINUED | OUTPATIENT
Start: 2023-06-07 | End: 2023-06-12 | Stop reason: HOSPADM

## 2023-06-07 RX ORDER — OLANZAPINE 20 MG/1
20 TABLET ORAL AT BEDTIME
Status: DISCONTINUED | OUTPATIENT
Start: 2023-06-07 | End: 2023-06-12 | Stop reason: HOSPADM

## 2023-06-07 RX ORDER — OLANZAPINE 5 MG/1
5 TABLET ORAL DAILY
Status: DISCONTINUED | OUTPATIENT
Start: 2023-06-08 | End: 2023-06-08

## 2023-06-07 RX ORDER — ACETAMINOPHEN 325 MG/1
650 TABLET ORAL EVERY 4 HOURS PRN
Status: DISCONTINUED | OUTPATIENT
Start: 2023-06-07 | End: 2023-06-12 | Stop reason: HOSPADM

## 2023-06-07 RX ORDER — HYDROXYZINE HYDROCHLORIDE 25 MG/1
25-50 TABLET, FILM COATED ORAL EVERY 4 HOURS PRN
Status: DISCONTINUED | OUTPATIENT
Start: 2023-06-07 | End: 2023-06-12 | Stop reason: HOSPADM

## 2023-06-07 RX ORDER — MAGNESIUM HYDROXIDE/ALUMINUM HYDROXICE/SIMETHICONE 120; 1200; 1200 MG/30ML; MG/30ML; MG/30ML
30 SUSPENSION ORAL EVERY 4 HOURS PRN
Status: DISCONTINUED | OUTPATIENT
Start: 2023-06-07 | End: 2023-06-12 | Stop reason: HOSPADM

## 2023-06-07 RX ORDER — OLANZAPINE 10 MG/2ML
10 INJECTION, POWDER, FOR SOLUTION INTRAMUSCULAR 3 TIMES DAILY PRN
Status: DISCONTINUED | OUTPATIENT
Start: 2023-06-07 | End: 2023-06-10

## 2023-06-07 RX ORDER — MAGNESIUM HYDROXIDE/ALUMINUM HYDROXICE/SIMETHICONE 120; 1200; 1200 MG/30ML; MG/30ML; MG/30ML
30 SUSPENSION ORAL EVERY 4 HOURS PRN
Status: CANCELLED | OUTPATIENT
Start: 2023-06-07

## 2023-06-07 RX ORDER — OLANZAPINE 10 MG/2ML
10 INJECTION, POWDER, FOR SOLUTION INTRAMUSCULAR 3 TIMES DAILY PRN
Status: CANCELLED | OUTPATIENT
Start: 2023-06-07

## 2023-06-07 RX ORDER — OLANZAPINE 5 MG/1
20 TABLET ORAL AT BEDTIME
Status: CANCELLED | OUTPATIENT
Start: 2023-06-07

## 2023-06-07 RX ORDER — OLANZAPINE 5 MG/1
5 TABLET ORAL DAILY
Status: CANCELLED | OUTPATIENT
Start: 2023-06-07

## 2023-06-07 RX ORDER — OLANZAPINE 5 MG/1
5-10 TABLET ORAL 3 TIMES DAILY PRN
Status: CANCELLED | OUTPATIENT
Start: 2023-06-07

## 2023-06-07 RX ORDER — HYDROXYZINE HYDROCHLORIDE 25 MG/1
25 TABLET, FILM COATED ORAL EVERY 6 HOURS PRN
Status: ON HOLD
Start: 2023-06-07 | End: 2023-06-12

## 2023-06-07 RX ORDER — TRAZODONE HYDROCHLORIDE 50 MG/1
50 TABLET, FILM COATED ORAL
Status: CANCELLED | OUTPATIENT
Start: 2023-06-07

## 2023-06-07 RX ORDER — HYDROXYZINE HYDROCHLORIDE 25 MG/1
25 TABLET, FILM COATED ORAL EVERY 4 HOURS PRN
Status: CANCELLED | OUTPATIENT
Start: 2023-06-07

## 2023-06-07 RX ORDER — AMOXICILLIN 250 MG
1 CAPSULE ORAL 2 TIMES DAILY PRN
Status: CANCELLED | OUTPATIENT
Start: 2023-06-07

## 2023-06-07 RX ADMIN — OLANZAPINE 5 MG: 5 TABLET, FILM COATED ORAL at 08:23

## 2023-06-07 RX ADMIN — OLANZAPINE 20 MG: 5 TABLET, FILM COATED ORAL at 19:58

## 2023-06-07 RX ADMIN — HYDROXYZINE HYDROCHLORIDE 50 MG: 25 TABLET, FILM COATED ORAL at 20:18

## 2023-06-07 ASSESSMENT — ACTIVITIES OF DAILY LIVING (ADL)
CONCENTRATING,_REMEMBERING_OR_MAKING_DECISIONS_DIFFICULTY: NO
ADLS_ACUITY_SCORE: 18
DRESSING/BATHING_DIFFICULTY: NO
CHANGE_IN_FUNCTIONAL_STATUS_SINCE_ONSET_OF_CURRENT_ILLNESS/INJURY: NO
ADLS_ACUITY_SCORE: 28
FALL_HISTORY_WITHIN_LAST_SIX_MONTHS: NO
WEAR_GLASSES_OR_BLIND: NO
ADLS_ACUITY_SCORE: 28
WALKING_OR_CLIMBING_STAIRS_DIFFICULTY: NO
TOILETING_ISSUES: NO
ADLS_ACUITY_SCORE: 18
DIFFICULTY_COMMUNICATING: NO
ADLS_ACUITY_SCORE: 18
ADLS_ACUITY_SCORE: 28
ADLS_ACUITY_SCORE: 18
DOING_ERRANDS_INDEPENDENTLY_DIFFICULTY: NO
ADLS_ACUITY_SCORE: 28
HEARING_DIFFICULTY_OR_DEAF: NO
ADLS_ACUITY_SCORE: 18
ADLS_ACUITY_SCORE: 28
DIFFICULTY_EATING/SWALLOWING: NO
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
## 2023-06-07 NOTE — CONSULTS
"      Psychiatry Consultation; Follow up              Reason for Consult, requesting source:    Meds, disposition. Patient seen by psych NP 6/4 and psychiatrist 6/5  Requesting source: Hospitalist    Labs and imaging reviewed, seen by JAMARI Degroot CNP  Total time spent in chart review, patient interview and coordination of care; 60 minutes                Interim history:    Serg is a 28-year-old white male who carries a diagnosis of undifferentiated schizophrenia admitted on 6/3/2023 for monitoring after intentional overdose of unknown medication and psychosis in the setting of medication nonadherent. The patient was adamant on getting home by 6/10 for sister's graduation. When Dr. Trammell evaluated patient 6/5, he recommended restarting home medications and having psych f/u.     Luckily, patient has been accepted to inpatient psychiatry and he was resting comfortable in bed. He did not have any immediate needs. Denied SI/HI. Guarded surrounding AVH. Agreeable to IP psych.         Current Medications:       OLANZapine  20 mg Oral At Bedtime     OLANZapine  5 mg Oral QAM     sodium chloride (PF)  3 mL Intracatheter Q8H              MSE:   Appearance: adequately groomed and morbidly obese  Attitude:  guarded and somewhat cooperative  Eye Contact:  fair  Mood:  \"fair\"  Affect:  : slightly restricted  Speech:  clear, coherent  Psychomotor Behavior:  no evidence of tardive dyskinesia, dystonia, or tics  Muscle strength and tone: baseline   Thought Process:  linear  Associations:  no loose associations  Thought Content:  no evidence of suicidal ideation or homicidal ideation  Insight:  partial  Judgement:  limited  Oriented to:  time, person, and place  Attention Span and Concentration:  fair  Recent and Remote Memory:  intact    Vital signs:  Temp: 97.7  F (36.5  C) Temp src: Oral BP: 136/75 Pulse: 104   Resp: 20 SpO2: 95 % O2 Device: None (Room air)   Height: 193 cm (6' 3.98\") Weight: (!) 190.2 kg (419 lb 4.8 " "oz)  Estimated body mass index is 51.06 kg/m  as calculated from the following:    Height as of this encounter: 1.93 m (6' 3.98\").    Weight as of this encounter: 190.2 kg (419 lb 4.8 oz).    Qtc: 457 on 6/7         DSM-5 Diagnosis:   Schizophrenia           Assessment/Plan:   Serg is a 28 year old male with a history of schizophrenia who was seen today for follow-up. Luckily, patient has been accepted to inpatient psychiatry and is voluntarily willing to go. His home medication was restarted 6/5 and today he has no immediate needs. Will defer to inpatient psychiatry.     1. Continue currently ordered medications  2. Transfer to inpatient psychiatry        Angelica Araya, NEAL-BC  Consult/Liaison Psychiatry   [facility]                 "

## 2023-06-07 NOTE — PLAN OF CARE
Goal Outcome Evaluation:  Pt spent most of his evening in his room laying down. Upon checking with him, he was observed talking to himself and making bizarre gestures. He reported that he was talking to God and God was telling him to be a grateful man...... He denied any pain and all psychotic symptoms including anxiety, depression, & SI/HI/SIB but later during the shift at around 1950 pt was observed yelling out and shouting from his room, actively talking to himself. Pt received scheduled Zyprexa 20 mg and prn hydroxyzine 50 mg. Pt was later calm but was still notably religiously pre-occupied and actively responding to internal stimuli. He intermittently was noted praying loudly at the lounge and other pts would laugh at him needing redirections to his room. Affect was flat and mood was somewhat tense. Vital signs are within normal limit. Pt ate about 100% of his dinner tray. He was medication compliant with not side effects noted or reported.

## 2023-06-07 NOTE — PLAN OF CARE
"  Problem: Depressive Symptoms  Goal: Depressive Symptoms  Description: Signs and symptoms of listed problems will be absent or manageable.  Outcome: Adequate for Care Transition   Goal Outcome Evaluation:       ADMIT: this pt arrived from  med surge unit after 5 days there from an overdose on his meds. He came to [facility] at 1300.  He stated to this RN that he \"was talking to GOD and GOD told him to come here to psychiatry\" when asked why he overdosed. \"It's ok, God loves me\".   He presents as mildly confused?  He is serious about God talking to him.  He agreed to be safe on the unit.   He requests to discharge to with in 3 days to go to a graduation for his sister.  He is redirectable.  Vitals stable  Will continue to assess.                 "

## 2023-06-07 NOTE — TELEPHONE ENCOUNTER
[facility] Access Inpatient Bed Call Log 6/6/23 @6:48 PM       Intake has called facilities that have not updated their bed status within the last 12 hours.                Adults:               [facility] is posting 0 beds.         [facility] is posting 0 beds. [phone]      [facility] is posting 0 beds. [phone]        [facility] is posting 0 beds. [phone]      [facility] is posting 0 beds. [phone]        [facility] is posting 0 beds. [phone]        [facility] is posting 0 beds. [phone]        [facility] is posting 0 beds. [phone]        [facility] is posting 0 bed. [phone]             [facility] is posting 0 beds. [phone]          [facility] is posting 1 beds. Mixed unit 12+. Low acuity only. [phone] Per website @7:27am      is positing 0 beds. No aggression.  [phone]        [facility] is posting 0 beds. [phone]       [facility] is posting 1 beds. Low acuity only. [phone]  @12:24 PM no beds left     [facility] is posting 0 beds. Low acuity. No current aggression. [phone]       [facility] is posting 0 beds. Low acuity. [phone]       [facility] is posting 0 beds. 72 HH preferred. [phone].      [facility] is posting 3 beds. Low acuity. [phone] Per website @4:30am     [facility] is posting 7 beds. Vol only, No Hx of aggression, violence, or assault. No sexual offenders. No 72 hr holds. [phone]         [facility] is posting 6 beds. (Must have the cognitive ability to do programming. No aggressive or violent behavior or recent HX in the last 2 yrs. MH must be primary.) [phone] Per website @6:56am     [facility] is posting 0 beds. Low acuity only. Violence and aggression capped. [phone] Per call @8:19am, currently reviewing pts in their ED      [facility] is posting 2 beds. Low acuity, Neg Covid. [phone] Per call @8:27, no available beds     [facility] is posting 3 beds. Covid neg. Vol only. Combined adolescent and adult unit. No aggressive or violent behavior. No registered sex offenders. [phone]     [facility] posting 2 beds. Negative covid.   -All beds being reviewed for     [facility] is posting 2 beds. [phone] no wounds, lines, drains, C-paps, tubes and must be able to care for themselves; no hx of aggression      [facility] is posting 10 beds. Call for details. [phone] Per call @8:06am beds available     [facility] is posting 2 beds. Mixed unit. [phone] Per website @1:05pm     Pt remains on work list pending appropriate bed availability.

## 2023-06-07 NOTE — TELEPHONE ENCOUNTER
9:45 AM Intake called [facility] ED spoke with nurse Sara was provided Dr. Katie Verduzco Phone number [phone] for doc to doc.  10:28 AM Paged Naegele for admit and @[10:58 AM] Naegele, Debra A acce[svetlana pt to Unit 6A/4A Mil[place]    Radha Haynes [id number] to 6A.     [10:58 AM] Naegele, Debra A Walker, Shanice [id number] to 6A.  11:45 AM spoke BENNETT Cisneros to provide disposition and no additional info is needed  11:50 AM called Medical Unit notified    6A: [phone]  Med/Surge: [phone]

## 2023-06-07 NOTE — PHARMACY-ADMISSION MEDICATION HISTORY
Please see Admission Medication History note completed on 6/5 under previous encounter for information regarding prior to admission medications.    Of note, while patient was on the medical unit, his PTA venlafaxine was held and discontinued upon admission. He was also started on PRN melatonin at bedtime and PRN hydroxyzine for anxiety.       Joanie Bartlett, PharmD   *[phone]

## 2023-06-07 NOTE — DISCHARGE SUMMARY
"[facility]  Hospitalist Discharge Summary      Date of Admission:  6/3/2023  Transfer Date :  6/7/2023   Discharging Provider: Katie Verduzco MD  Discharge Service: Hospitalist Service, GOLD TEAM 16    Discharge Diagnoses   #Intentional overdose, unknown medication  #Schizophrenia, chronic undifferentiated   # hyperglycemia      Clinically Significant Risk Factors     # Severe Obesity: Estimated body mass index is 51.06 kg/m  as calculated from the following:    Height as of this encounter: 1.93 m (6' 3.98\").    Weight as of this encounter: 190.2 kg (419 lb 4.8 oz).       Follow-ups Needed After Discharge   Follow-up Appointments     Follow Up and recommended labs and tests      Follow up with primary care provider in after  discharge             Unresulted Labs Ordered in the Past 30 Days of this Admission     No orders found from 5/4/2023 to 6/4/2023.          Discharge Disposition   Transferred to inpatient psychiatric unit   Condition at discharge: Stable    Hospital Course      Serg Tejada is a 28 year old male admitted on 6/3/2023. He is admitted for monitoring after intentional overdose of unknown medication and psychosis in the setting of medication non-adherance.      #Intentional overdose, unknown medication  Poison control contacted in the ED, primary concern is for ingestion of venlafaxine with risk of seizures and hypoglycemia.  - blood sugar have been stable , no evidence of hypoglycemia so of glucose monitoring, hemodynamically remained stable   - patient  Denies any suicidal ideations to me,  He does not know what medications he took , per notes he took 40-60 pills that he found in his old backpack  In attempt to commit suicide . He recently lost hi smother per ED notes   - tox screen was + for cannabinoids   - LFT remained normal   - can stop seizure precautions  - Continue bedside attendant till transfer     patient  Was accepted for transfer " "for inpatient psychiatric unit, I spoke with Dr Naegele regarding patient . Patient  Is aware of transfer         #Schizophrenia, chronic undifferentiated   Group home reports that he has not been taking his medications. Given unknown medication,psychiatric medications held    - seen by psychytary , recommended to restart home medications ,back on zyprexa 20 mg at night and 5 in AM  Not back on effexor   - monitor EKG for qTC ,  EKG 6/7  qTC 457 . EKG 6/3 qTC 474   - he hear voices, this seems to be chronic      Per psych:   \" This patient is still voluntary, but he would like to go to his sister's graduation party.  I told him this may or may not be possible. It will depend on his clinical improvement, but likely he will still need inpatient psychiatric stay for stabilization.  Hopefully, he will get much better on his medication regimen\"      - patient  Want to leave by 6/10 for sister's graduation, asked psychiatry to resee and see if needs any medications adjustment and to see if patient  Needs inpatient psych treatment/hold          hyperglycemia  - no history of DM,  HgA1c 5.8             Consultations This Hospital Stay   DIAGNOSTIC EVALUATION CENTER (DEC) ASSESSMENT ORDER  PSYCHIATRY IP CONSULT  PSYCHIATRY IP CONSULT  PSYCHIATRY IP CONSULT    Code Status   Full Code    Time Spent on this Encounter   I, Katie Verduzco MD, personally saw the patient today and spent greater than 30 minutes discharging this patient.       Katie Verduzco MD  [facility] MED SURG  [address] [place] [address]  Phone: [phone]  Fax: [phone]  ______________________________________________________________________    Physical Exam   Vital Signs: Temp: 97.7  F (36.5  C) Temp src: Oral BP: 136/75 Pulse: 104   Resp: 20 SpO2: 95 % O2 Device: None (Room air)    Weight: 419 lbs 4.8 oz   NECK: supple  RESPIRATORY: lungs are clear   CARDIOVASCULAR: normal S1S2 regular rate and " rhythm,  GASTROINTESTINAL: abdomen is  soft,  non-distended, non-tender with normal bowel sounds.  MUSCULOSKELETAL: normal muscle bulk and tone  SKIN: warm and dry, no rashes, no mottling noted   NEUROLOGIC: awake alert and oriented, speech clear    EXTREMITIES: no clubbing cyanosis or edema noted  moves all extremities          Primary Care Physician   Roya Larsen    Discharge Orders      Follow Up and recommended labs and tests    Follow up with primary care provider in after  discharge     Reason for your hospital stay    Intentional drug overdose     Activity - Up ad jeremiah     Full Code    Previous documentation     Diet    Follow this diet upon discharge: as tolerated       Significant Results and Procedures   Most Recent 3 CBC's:Recent Labs   Lab Test 06/07/23  0652 06/06/23  0634 06/05/23  1110   WBC 8.4 9.2 7.6   HGB 14.8 14.1 13.6   MCV 89 88 90    357 334     Most Recent 3 BMP's:Recent Labs   Lab Test 06/07/23  0652 06/06/23  0634 06/05/23  1005 06/05/23  0818 06/05/23  0732    141  --   --  140   POTASSIUM 4.0 4.2  --   --  4.0  4.1   CHLORIDE 104 106  --   --  105   CO2 25 24  --   --  23   BUN 13.6 14.7  --   --  11.0   CR 0.70 0.77  --   --  0.72   ANIONGAP 11 11  --   --  12   WINNIE 9.3 9.5  --   --  9.1   * 120* 118*   < > 106*    < > = values in this interval not displayed.     Most Recent 2 LFT's:Recent Labs   Lab Test 06/07/23  0652 06/06/23  0634   AST 28 26   ALT 43 41   ALKPHOS 93 104   BILITOTAL 0.5 0.3   , No results found for this or any previous visit.    Discharge Medications   Current Discharge Medication List      START taking these medications    Details   hydrOXYzine (ATARAX) 25 MG tablet Take 1 tablet (25 mg) by mouth every 6 hours as needed for other (adjuvant pain)    Associated Diagnoses: Anxiety      melatonin 1 MG TABS tablet Take 1 tablet (1 mg) by mouth nightly as needed for sleep    Associated Diagnoses: Insomnia, unspecified type          CONTINUE these medications which have NOT CHANGED    Details   !! OLANZapine (ZYPREXA) 20 MG tablet Take 20 mg by mouth At Bedtime      !! OLANZapine (ZYPREXA) 5 MG tablet Take 5 mg by mouth every morning       !! - Potential duplicate medications found. Please discuss with provider.      STOP taking these medications       venlafaxine (EFFEXOR XR) 150 MG 24 hr capsule Comments:   Reason for Stopping:             Allergies   No Known Allergies

## 2023-06-07 NOTE — PLAN OF CARE
"Blood pressure 136/75, pulse 104, temperature 97.7  F (36.5  C), temperature source Oral, resp. rate 20, height 1.93 m (6' 3.98\"), weight (!) 190.2 kg (419 lb 4.8 oz), SpO2 95 %.    Major shift changes: See flowsheet for VS and detail assessments      Respiratory/cardiac: Clear, denies SOB or chest pain. RA.      Neuro:: A/O x4, no N/T. Pt denies SI. Pt has auditory hallucination \"Stating I speak with god\". Mentions god is not telling him anything negative.       Pain: No pain      Activity: Independents in room. Sitter present for safety. Dependent +1 edema noted.       GI/: Continent of bowel and bladder      Skin: Scrapes, redness to R/L knees. Otherwise intact.        IV: R PIV SL.      Plan: Awaiting psychiatric bed to become available for transfer. Unknown discharge date.                   "

## 2023-06-07 NOTE — CARE PLAN
06/07/23 1344   Patient Belongings   Did you bring any home meds/supplements to the hospital?  No   Patient Belongings locker;sent to security per site process   Patient Belongings Put in Hospital Secure Location (Security or Locker, etc.) cell phone/electronics;cash/credit card;clothing;keys;necklace;wallet  (White Iphone w/cracked back and scratches in from screen, bottle of water, pack of cigarette with one lighter)   Belongings Search Yes   Clothing Search Yes     Belonging sent to Security: Pouch([id number])    [place] ID card, Visa cardX3, [place] EBT, Discover card    A               Admission:  I am responsible for any personal items that are not sent to the safe or pharmacy.  [facility] is not responsible for loss, theft or damage of any property in my possession.    Signature:  _________________________________ Date: _______  Time: _____                                              Staff Signature:  ____________________________ Date: ________  Time: _____      2nd Staff person, if patient is unable/unwilling to sign:    Signature: ________________________________ Date: ________  Time: _____     Discharge:  [facility] has returned all of my personal belongings:    Signature: _________________________________ Date: ________  Time: _____                                          Staff Signature:  ____________________________ Date: ________  Time: _____

## 2023-06-07 NOTE — DISCHARGE INSTRUCTIONS
Behavioral Discharge Planning and Instructions    Summary: You were admitted on 6/7/2023  due to suicide attempt and psychosis.  You were treated by Debra Naegele APRN and discharged 06/12/2023 on 6AE from 6A to  Family    Main Diagnosis:   1. S/p serious overdose attempt with venlafaxine   2. Schizophrenia, undifferitiated     Health Care Follow-up:     Psychiatry appointment: Tuesday June 27th at 1:30pm in-office  Provider: Zbigniew Rodriguez MD  [facility]  [address] [place] [address]  Phone: [phone]  Fax: [phone]    Diagnostic Assessment at [facility] June 20th at 1pm in person   [address] [place] [address], [address]  Phone: [phone]  ** Go to room F-140 next to the [address].     Attend all scheduled appointments with your outpatient providers. Call at least 24 hours in advance if you need to reschedule an appointment to ensure continued access to your outpatient providers.     Major Treatments, Procedures and Findings:  You were provided with: a psychiatric assessment, assessed for medical stability, medication evaluation and/or management, group therapy, milieu management, and medical interventions    Symptoms to Report: feeling more aggressive, increased confusion, losing more sleep, mood getting worse, or thoughts of suicide    Early warning signs can include: increased depression or anxiety sleep disturbances increased thoughts or behaviors of suicide or self-harm  increased unusual thinking, such as paranoia or hearing voices    Safety and Wellness:  Take all medicines as directed.  Make no changes unless your doctor suggests them.      Follow treatment recommendations.  Refrain from alcohol and non-prescribed drugs.  If there is a concern for safety, call 301.    Resources:   Crisis Intervention: 907.214.6428 or 573-252-1100 (TTY: 266.263.1866).  Call anytime for help.  National [place] on Mental Illness (www.[place].ezra.org): 613.490.6637 or  "910.217.8440.  Suicide Awareness Voices of Education (SAVE) (www.save.org): 730-573-PWEV (0599)  National Suicide Prevention Line (www.mentalhealthmn.org): 592-167-UUBK (5979)  [facility] Crisis (COPE) Response - Adult 812 609-7151  Text 4 Life: txt \"LIFE\" to 55340 for immediate support and crisis intervention    General Medication Instructions:   See your medication sheet(s) for instructions.   Take all medicines as directed.  Make no changes unless your doctor suggests them.   Go to all your doctor visits.  Be sure to have all your required lab tests. This way, your medicines can be refilled on time.  Do not use any drugs not prescribed by your doctor.  Avoid alcohol.    Advance Directives:   Scanned document on file with [facility]? No scanned doc  Is document scanned? No. Copy Requested.  Honoring Choices Your Rights Handout: Informed and given  Was more information offered? Pt declined    The Treatment team has appreciated the opportunity to work with you. If you have any questions or concerns about your recent admission, you can contact the unit which can receive your call 24 hours a day, 7 days a week. They will be able to get in touch with a Provider if needed. The unit number is [phone] .             "

## 2023-06-07 NOTE — TELEPHONE ENCOUNTER
0133 Bed Search Update:    [facility]: at capacity    [facility]: at capacity per website     [facility]: at capacity per website     [facility]: at capacity per unit staff    [facility]: at capacity per website     [facility]: at capacity per website     [facility]: at capacity per website     [facility]: at capacity per website     [facility] Posting 2 beds    [facility]: at capacity per website     [facility]: Declined on 6/6.  (Mixed unit/Low acuity only).     [facility]: at capacity per website     [facility]: at capacity per website     [facility]: at capacity per website     [facility]: at capacity per website.  No Intake after 10PM.       [facility]: at capacity per website.    [facility]: at capacity per website    [facility]: Posting 3 beds.  Declined on 6/6.  Low acuity    Remains on wait list.

## 2023-06-08 PROCEDURE — 99232 SBSQ HOSP IP/OBS MODERATE 35: CPT | Performed by: CLINICAL NURSE SPECIALIST

## 2023-06-08 PROCEDURE — 250N000013 HC RX MED GY IP 250 OP 250 PS 637: Performed by: CLINICAL NURSE SPECIALIST

## 2023-06-08 PROCEDURE — 128N000002 HC R&B CD/MH ADOLESCENT

## 2023-06-08 RX ORDER — OLANZAPINE 10 MG/1
10 TABLET ORAL DAILY
Status: DISCONTINUED | OUTPATIENT
Start: 2023-06-09 | End: 2023-06-12 | Stop reason: HOSPADM

## 2023-06-08 RX ADMIN — TRAZODONE HYDROCHLORIDE 50 MG: 50 TABLET ORAL at 23:59

## 2023-06-08 RX ADMIN — OLANZAPINE 20 MG: 5 TABLET, FILM COATED ORAL at 19:57

## 2023-06-08 RX ADMIN — ACETAMINOPHEN 650 MG: 325 TABLET ORAL at 04:50

## 2023-06-08 RX ADMIN — OLANZAPINE 5 MG: 5 TABLET, FILM COATED ORAL at 08:15

## 2023-06-08 ASSESSMENT — ACTIVITIES OF DAILY LIVING (ADL)
ADLS_ACUITY_SCORE: 28

## 2023-06-08 NOTE — CARE PLAN
Goal Outcome Evaluation:  Problem: Sleep Disturbance  Goal: Adequate Sleep/Rest  Outcome: Ongoing, No change    Focus: Shift summary    Data: Patient slept 6.25 hours last night. See note by writer @ 0458 on 6/8/2023 for interventions. Patient had intermittent episodes of shouting in his room and talking to self; given tylenol 650 mg at 0458 for back pain of 9/10. Respirations even and unlabored on status 15 checks. Will continue to monitor and report to oncoming staff.    Response: Report sleep hours to day shift. Continue to monitor patient and provide therapeutic interventions as necessary.

## 2023-06-08 NOTE — PLAN OF CARE
Coordination of Care NOTE:    Care Coordinator confirmed the following appointments:    Psychiatry appointment: Tuesday June 27th at 1:30pm in-office  Provider: Zbigniew Rodriguez MD  [address] [address]  Phone: [phone]  Fax: [phone]      Care Coordinator updated pt's JEAN CLAUDES    Steve Fish  Care Coordinator  [email or web address]   [phone]

## 2023-06-08 NOTE — PROGRESS NOTES
"[facility] [place]   Psychiatric Progress Note        Interim History:   The patient's care was discussed with the treatment team during the daily team meeting and/or staff's chart notes were reviewed.  Staff report patient isolates to room.     Psychiatric symptoms and interventions:     Patient did not acknowledge that he was in distress last evening. Patient stated, \"I was just praying\". Provider recommended increasing Zyprexa to 10 mg in the morning. Continue HS dose at 20 mg. Patient was agreeable.     Patient reports his back hurts from his bed. Patient states Tylenol and hot packs were helpful . Ordered soft care matress.     Patient continues to be religiously preoccupied. He denies suicidal thinking. Patient agreed that he needs more support in the community.    Provider consulted with [facility] regarding discharge planning.          Medications:       OLANZapine  20 mg Oral At Bedtime     OLANZapine  5 mg Oral Daily          Allergies:   No Known Allergies       Labs:   No results found for this or any previous visit (from the past 24 hour(s)).       Psychiatric Examination:     /80 (BP Location: Left arm, Patient Position: Sitting, Cuff Size: Adult Large)   Pulse 105   Temp 97.2  F (36.2  C) (Temporal)   Resp 16   Weight is 0 lbs 0 oz  There is no height or weight on file to calculate BMI.  Orthostatic Vitals     None            Appearance: awake, alert, adequately groomed and dressed in hospital scrubs  Attitude:  cooperative  Eye Contact:  good  Mood:  good  Affect:  appropriate and in normal range  Speech:  normal prosody  Psychomotor Behavior:  no evidence of tardive dyskinesia, dystonia, or tics  Throught Process:  goal oriented  Associations:  no loose associations  Thought Content:  no evidence of suicidal ideation or homicidal ideation and auditory hallucinations present \"God's voice, religiously preoccupied.   Insight:  limited  Judgement:  limited  Oriented to:  " time, person, and place  Attention Span and Concentration:  intact  Recent and Remote Memory:  intact    Clinical Global Impressions  First:     Most recent:            Precautions:     Behavioral Orders   Procedures     Code 1 - Restrict to Unit     Routine Programming     As clinically indicated     Status 15     Every 15 minutes.     Suicide precautions     Patients on Suicide Precautions should have a Combination Diet ordered that includes a Diet selection(s) AND a Behavioral Tray selection for Safe Tray - with utensils, or Safe Tray - NO utensils            DIagnoses:   1. S/p serious overdose attempt with venlafaxine   2. Schizophrenia, undifferitiated          Plan:     Legal status: Voluntary     Medication management:     Medical: Obesity, hyperglycemia  Patient declined nutrition consult.     Behavioral/psychology/social:   Encouraged patient to attend therapeutic hospital programming as tolerated.   Precautions: suicide    Disposition:   Reason for continued hospitalization: patient is at high risk for self harm.   Discharge: TBD Patient lives at a [place] [facility] [place].

## 2023-06-08 NOTE — CARE PLAN
Focus: PRN Administration    Data: Patient heard shouting from his room. PA went to check on patient and patient stated he was having pain in his back. Writer gave patient tylenol 650 mg PO PRN at patients request; patient educated on medication and also given warm pack. Patient was heard talking to self loudly in room afterwards. Will continue to monitor and provide interventions as necessary.    Response: Given tylenol 650 mg PO PRN; re-assess pain in one hour. Will continue to monitor and provide interventions as necessary.

## 2023-06-08 NOTE — PROGRESS NOTES
06/08/23 1335   Individualization/Patient Specific Goals   Patient Personal Strengths stable living environment;socioeconomic stability;independent living skills;intellectual cognitive skills;spiritual/Holiness support   Patient Vulnerabilities adverse childhood experience(s);lacks insight into illness;occupational insecurity;limited support system;other (see comments)   Anxieties, Fears or Concerns Depression and grief from mom's death   Patient/Family-Specific Goals (Include Timeframe) Improved medication   Interprofessional Rounds   Participants advanced practice nurse;nursing;OT;ANN MARIE   Behavioral Team Discussion   Participants Debra Naegele APRN, Deb JESUS, Blayne Giraldo RN, Tarsha Dash OT   Progress New admit   Anticipated length of stay 3-5 days   Continued Stay Criteria/Rationale Psychosis   Medical/Physical No acute medical issues   Plan Mdication managemen and group support   Rationale for change in precautions or plan Iniital plan   Safety Plan Safe secure milieu   Anticipated Discharge Disposition group home     PRECAUTIONS AND SAFETY    Behavioral Orders   Procedures    Code 1 - Restrict to Unit    Routine Programming     As clinically indicated    Status 15     Every 15 minutes.    Suicide precautions     Patients on Suicide Precautions should have a Combination Diet ordered that includes a Diet selection(s) AND a Behavioral Tray selection for Safe Tray - with utensils, or Safe Tray - NO utensils
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
## 2023-06-08 NOTE — PLAN OF CARE
Problem: Depressive Symptoms  Goal: Social and Therapeutic (Depression)  Description: Signs and symptoms of listed problems will be absent or manageable.  Outcome: Adequate for Care Transition   Goal Outcome Evaluation:       Pt was up early this morning. He eats and takes meds. He has been sleeping the rest of the day shift.  We will continue to assess.

## 2023-06-09 LAB
GLUCOSE BLDC GLUCOMTR-MCNC: 127 MG/DL (ref 70–99)
MAGNESIUM SERPL-MCNC: 2.1 MG/DL (ref 1.7–2.3)
TROPONIN T SERPL HS-MCNC: 6 NG/L

## 2023-06-09 PROCEDURE — 250N000013 HC RX MED GY IP 250 OP 250 PS 637: Performed by: CLINICAL NURSE SPECIALIST

## 2023-06-09 PROCEDURE — 99221 1ST HOSP IP/OBS SF/LOW 40: CPT | Performed by: PHYSICIAN ASSISTANT

## 2023-06-09 PROCEDURE — 83735 ASSAY OF MAGNESIUM: CPT | Performed by: PSYCHIATRY & NEUROLOGY

## 2023-06-09 PROCEDURE — 99232 SBSQ HOSP IP/OBS MODERATE 35: CPT | Performed by: CLINICAL NURSE SPECIALIST

## 2023-06-09 PROCEDURE — 93010 ELECTROCARDIOGRAM REPORT: CPT | Performed by: INTERNAL MEDICINE

## 2023-06-09 PROCEDURE — 84484 ASSAY OF TROPONIN QUANT: CPT | Performed by: PSYCHIATRY & NEUROLOGY

## 2023-06-09 PROCEDURE — 128N000002 HC R&B CD/MH ADOLESCENT

## 2023-06-09 PROCEDURE — 93005 ELECTROCARDIOGRAM TRACING: CPT

## 2023-06-09 PROCEDURE — 36415 COLL VENOUS BLD VENIPUNCTURE: CPT | Performed by: PSYCHIATRY & NEUROLOGY

## 2023-06-09 PROCEDURE — 250N000011 HC RX IP 250 OP 636: Performed by: PHYSICIAN ASSISTANT

## 2023-06-09 RX ORDER — ONDANSETRON 4 MG/1
4 TABLET, ORALLY DISINTEGRATING ORAL EVERY 6 HOURS PRN
Status: DISCONTINUED | OUTPATIENT
Start: 2023-06-09 | End: 2023-06-12 | Stop reason: HOSPADM

## 2023-06-09 RX ADMIN — OLANZAPINE 20 MG: 5 TABLET, FILM COATED ORAL at 19:09

## 2023-06-09 RX ADMIN — ALUMINUM HYDROXIDE, MAGNESIUM HYDROXIDE, AND SIMETHICONE 30 ML: 200; 200; 20 SUSPENSION ORAL at 19:15

## 2023-06-09 RX ADMIN — TRAZODONE HYDROCHLORIDE 50 MG: 50 TABLET ORAL at 23:58

## 2023-06-09 RX ADMIN — ONDANSETRON 4 MG: 4 TABLET, ORALLY DISINTEGRATING ORAL at 21:33

## 2023-06-09 RX ADMIN — OLANZAPINE 10 MG: 10 TABLET, FILM COATED ORAL at 09:11

## 2023-06-09 RX ADMIN — HYDROXYZINE HYDROCHLORIDE 50 MG: 25 TABLET, FILM COATED ORAL at 17:25

## 2023-06-09 RX ADMIN — HYDROXYZINE HYDROCHLORIDE 50 MG: 25 TABLET, FILM COATED ORAL at 11:35

## 2023-06-09 ASSESSMENT — ACTIVITIES OF DAILY LIVING (ADL)
ADLS_ACUITY_SCORE: 28
DRESS: SCRUBS (BEHAVIORAL HEALTH)
ADLS_ACUITY_SCORE: 28
ORAL_HYGIENE: INDEPENDENT
ADLS_ACUITY_SCORE: 28
HYGIENE/GROOMING: INDEPENDENT
ADLS_ACUITY_SCORE: 28

## 2023-06-09 NOTE — PLAN OF CARE
Problem: Psychotic Symptoms  Goal: Psychotic Symptoms  Description: Signs and symptoms of listed problems will be absent or manageable.  Flowsheets (Taken 6/9/2023 1834)  Psychotic Symptoms Assessed:    anxiety    affect  Psychotic Symptoms Present:    affect    anxiety    thought process  Goal: Social and Therapeutic (Psychotic Symptoms)  Description: Signs and symptoms of listed problems will be absent or manageable.  Recent Flowsheet Documentation  Taken 6/9/2023 1834 by Nora Bennett RN  Psychotic Symptoms Assessed:    anxiety    affect  Psychotic Symptoms Present:    affect    anxiety    thought process   Goal Outcome Evaluation:  BP (!) 140/84 (BP Location: Left arm)   Pulse 106   Temp 97.5  F (36.4  C) (Oral)   Resp 16   SpO2 100%              Pt slept until 1740. When he woke up he was talking to himself yelling and screaming. While he was eating dinner he was c/o not feeling well, and chest pain. His vs was  BP (!) 140/84 (BP Location: Left arm)   Pulse 106   Temp 97.5  F (36.4  C) (Oral)   Resp 16   SpO2 100%  B/G was 127 pt ate dinner , encouraged him to drink water and gave him Atarax 50mg, paged the cross over around 1730 but the provider didn't call back, paged Dr Lock and she gave orders for EKG, troponin, mg level and consult for medicine. EKG was normal, troponin was 6, mg was 2.1. pt kept asking he wanted to go back to the medical unit and wanted to be seen by the provider. Around 1900 pt was asleep. Pt has been seen by the PA around 2000 see her note. Gave him Maalox with his bedtime medication. Pt was calm, cooperative and medication compliant. Denied having SI/SIB/AH/VH and anxiety but appeared to be responding to internal stimuli. Pt was clean and well groomed and took a shower. Pt had flat blunted affect. Pt had BLE edema. Gave Zofran 4mg at bedtime for nausea. Pt had bedtime snacks and went back to sleep around 2100. Pt denied having chest pain around bedtime.

## 2023-06-09 NOTE — PROGRESS NOTES
"[facility] [place]   Psychiatric Progress Note        Interim History:   The patient's care was discussed with the treatment team during the daily team meeting and/or staff's chart notes were reviewed.  Staff report patient isolates to room.      Psychiatric symptoms and interventions:     Patient continues to be religiously preoccupied. Patient reports he hears the voice of God.Patient report his mood is \"good\". Patient lacks insight into his mental illness. He is not acknowledging the seriousness of his last overdose. Provider discussed importance of building coping skills by going to groups. Patient was not interested.        Patient reports adequate sleep and appetite. ADL's are OK.      Provider consulted with [facility] regarding discharge planning. Provider discussed with patient going to day tx after discharge. Patient reports he applied for a  waiver and [facility] worker.          Medications:       OLANZapine  10 mg Oral Daily     OLANZapine  20 mg Oral At Bedtime          Allergies:   No Known Allergies       Labs:   No results found for this or any previous visit (from the past 24 hour(s)).       Psychiatric Examination:     BP (!) 140/84 (Patient Position: Sitting)   Pulse 97   Temp 97.4  F (36.3  C) (Temporal)   Resp 16   SpO2 95%   Weight is 0 lbs 0 oz  There is no height or weight on file to calculate BMI.  Orthostatic Vitals     None           Appearance: awake, alert, adequately groomed and dressed in hospital scrubs  Attitude:  cooperative  Eye Contact:  good  Mood:  good  Affect:  appropriate and in normal range  Speech:  normal prosody  Psychomotor Behavior:  no evidence of tardive dyskinesia, dystonia, or tics  Throught Process:  goal oriented  Associations:  no loose associations  Thought Content:  no evidence of suicidal ideation or homicidal ideation and auditory hallucinations present \"God's voice, religiously preoccupied.   Insight:  limited  Judgement:  " limited  Oriented to:  time, person, and place  Attention Span and Concentration:  intact  Recent and Remote Memory:  intact         Clinical Global Impressions  First:     Most recent:            Precautions:     Behavioral Orders   Procedures     Code 1 - Restrict to Unit     Routine Programming     As clinically indicated     Status 15     Every 15 minutes.     Suicide precautions     Patients on Suicide Precautions should have a Combination Diet ordered that includes a Diet selection(s) AND a Behavioral Tray selection for Safe Tray - with utensils, or Safe Tray - NO utensils            DIagnoses:   1. S/p serious overdose attempt with venlafaxine   2. Schizophrenia, undifferitiated          Plan:     Legal status: Voluntary      Medication management:      Medical: Obesity, hyperglycemia  Patient declined nutrition consult.      Behavioral/psychology/social:   Encouraged patient to attend therapeutic hospital programming as tolerated.   Precautions: suicide     Disposition:   Reason for continued hospitalization: patient is at high risk for self harm.   Discharge: TBD Patient lives at a Board and [place].

## 2023-06-09 NOTE — CARE PLAN
Goal Outcome Evaluation:  Problem: Sleep Disturbance  Goal: Adequate Sleep/Rest  Outcome: Ongoing, Declining    Focus: Shift summary    Data: Patient slept 4.75 hours last night. Patient woke up at 0500 and continues to intermittently talk to self loudly in the room; took shower in room this AM; No interventions needed or requested by patient. Respirations even and unlabored on status 15 checks. Will continue to monitor and report to oncoming staff.    Response: Report sleep hours to day shift. Continue to monitor patient and provide therapeutic interventions as necessary.

## 2023-06-09 NOTE — PLAN OF CARE
BP (!) 140/84 (Patient Position: Sitting)   Pulse 97   Temp 97.4  F (36.3  C) (Temporal)   Resp 16   SpO2 95%   Iso:  No active isolations  Diet: Regular Diet Adult   Mental Status:   Alert and oriented x 4.  Problem: Adult Behavioral Health Plan of Care  Goal: Adheres to Safety Considerations for Self and Others  Outcome: Progressing  Flowsheets (Taken 6/9/2023 1243)  Adheres to Safety Considerations for Self and Others: achieves outcome   Goal Outcome Evaluation:       RN Assessment:  SI/Self harm:  Declined   Aggression/agitation/HI:  Declined  AVH: Speaking to god   Sleep:Adequate.  PRN Med: No PRNs administered this shift  Medication AE:   Physical Complaints/Issues:  I & O: eating and drinking well    ADLs: independent  COVID 19 Assessment:    Milieu Participation:  Behavior:  Pt is alert and alert and oriented x 4. Denies having pain and SOB. Declined AVH,SI,SIB and was contracted for safety. Was anxious this morning but unable to rate his anxiety . 50 mg Atarax was administered. Pt  complained  of not 'feeling well and want to be out of here'. Asked what he meant by not feeling well,he said he is feeling tired and has nausea and would like to speak to the provider. BP: 144/88 and the rest of the vital signs are within the normal baselines. Nursing assessment done.Provider was notified through teams. . Still speaking loudly to God. Spiritual therapist came to see the pt at 1 Pm but pt declined to meet him.. Continue with current care plan.

## 2023-06-09 NOTE — PLAN OF CARE
"  Problem: Depressive Symptoms  Goal: Social and Therapeutic (Depression)  Description: Signs and symptoms of listed problems will be absent or manageable.  Outcome: Adequate for Care Transition   Goal Outcome Evaluation:    nursing assessment:  Pt evaluation continues. Assessed mood, anxiety, thoughts, and behavior. Is progressing towards goals. Encourage participation in groups and developing healthy coping skills. Pt denies auditory or visual  hallucinations but then states he still has conversations aloud \"with God\". Refer to daily team meeting notes for individualized plan of care. Will continue to assess.                   "

## 2023-06-10 PROCEDURE — 128N000002 HC R&B CD/MH ADOLESCENT

## 2023-06-10 PROCEDURE — 250N000013 HC RX MED GY IP 250 OP 250 PS 637: Performed by: CLINICAL NURSE SPECIALIST

## 2023-06-10 RX ORDER — HALOPERIDOL 5 MG/1
5 TABLET ORAL 3 TIMES DAILY PRN
Status: DISCONTINUED | OUTPATIENT
Start: 2023-06-10 | End: 2023-06-10

## 2023-06-10 RX ORDER — HALOPERIDOL 5 MG/1
5 TABLET ORAL 3 TIMES DAILY PRN
Status: DISCONTINUED | OUTPATIENT
Start: 2023-06-10 | End: 2023-06-12 | Stop reason: HOSPADM

## 2023-06-10 RX ADMIN — OLANZAPINE 20 MG: 5 TABLET, FILM COATED ORAL at 19:22

## 2023-06-10 RX ADMIN — HYDROXYZINE HYDROCHLORIDE 50 MG: 25 TABLET, FILM COATED ORAL at 08:30

## 2023-06-10 RX ADMIN — OLANZAPINE 10 MG: 5 TABLET, FILM COATED ORAL at 12:00

## 2023-06-10 RX ADMIN — HYDROXYZINE HYDROCHLORIDE 50 MG: 25 TABLET, FILM COATED ORAL at 18:18

## 2023-06-10 RX ADMIN — OLANZAPINE 10 MG: 10 TABLET, FILM COATED ORAL at 08:30

## 2023-06-10 RX ADMIN — ALUMINUM HYDROXIDE, MAGNESIUM HYDROXIDE, AND SIMETHICONE 30 ML: 200; 200; 20 SUSPENSION ORAL at 18:18

## 2023-06-10 ASSESSMENT — ACTIVITIES OF DAILY LIVING (ADL)
ADLS_ACUITY_SCORE: 28
ORAL_HYGIENE: INDEPENDENT
ADLS_ACUITY_SCORE: 28
HYGIENE/GROOMING: INDEPENDENT
LAUNDRY: WITH SUPERVISION
ADLS_ACUITY_SCORE: 28
DRESS: INDEPENDENT
ADLS_ACUITY_SCORE: 28
ADLS_ACUITY_SCORE: 28

## 2023-06-10 NOTE — CARE PLAN
Goal Outcome Evaluation:  Problem: Sleep Disturbance  Goal: Adequate Sleep/Rest  Outcome: Ongoing, Declining    Focus: Shift summary    Data: Patient slept 4 hours last night. Received trazodone 50 mg PO PRN at 2358 this shift for sleep promotion; patient continued to be intermittently awake during the night sometimes talking to self loudly in room; patient denied need for further interventions. Respirations even and unlabored on status 15 checks. Will continue to monitor and report to oncoming staff.    Response: Report sleep hours to day shift. Continue to monitor patient and provide therapeutic interventions as necessary.

## 2023-06-10 NOTE — CONSULTS
Brief Medicine Consult Note    Received stat consult for chest pain.  EKG without ischemic changes, troponin negative.     Evaluated patient at bedside. Patient states he has chest heaviness and overwhelming feelings like something is wrong and he's going to die. He states every time he falls asleep he jolts awake with chest pain.  He also endorses nausea and not wanting to eat.  He also describes swelling in his bilateral ankles and feet that is worsening since he's been here.      BP (!) 140/84 (BP Location: Left arm)   Pulse 106   Temp 97.5  F (36.4  C) (Oral)   Resp 16   SpO2 100%     GENERAL: Alert and oriented x 3. Anxious appearing. Ambulatory. Cooperative.   HEENT: Anicteric sclera. Mucous membranes moist. NC. AT.   CV: RRR. S1, S2. No murmurs appreciated.   RESPIRATORY: Effort normal on RA. Lungs CTAB with no wheezing, rales, rhonchi.   GI: Abdomen soft and non distended with normoactive bowel sounds present in all quadrants. No tenderness   NEUROLOGICAL: No focal deficits. Moves all extremities.    EXTREMITIES: 1+ non pitting edema of bilateral ankles and feet   SKIN: No jaundice. No rashes.    Assessment/Plan:   -suspect chest pain related to somatization of psychiatric illness. Low suspicion for cardiac origin of his chest pain given normal EKG and negative troponin.   -added zofran for complaints of nausea.   -reviewed patient's labs from 6/7/23.  Electrolytes, renal and liver function, and CBC all WNLs.     Internal medicine will sign off. Please do not hesitate to call our team with any questions or concerns.     Jamia Flores PA-C  Hospitalist Service  [facility] [place]  Pager: [phone]

## 2023-06-10 NOTE — PLAN OF CARE
Problem: Psychotic Symptoms  Goal: Psychotic Symptoms  Description: Signs and symptoms of listed problems will be absent or manageable.  Outcome: Progressing   Goal Outcome Evaluation:  /78 (BP Location: Left arm)   Pulse 106   Temp 97.5  F (36.4  C) (Oral)   Resp 16   SpO2 100%                    Pt slept until dinner time came out to  eat dinner. Good appetite and fluid intake and ate 100% of his dinner. After dinner, he went back to his room. Around 1930 pt was c/o chest pain and stomach ache gave him Atarax 50mg and Maalox, encouragied him to take a nap. Pt was calm, cooperative and medication compliant. Pt had Zyprexa 30mg schedule and 30mg PRN. Pt got 20mg of Zyprexa this AM. Paged  on-call provider, Dr Tellez gave an order to discontinue Zyprexa 30mg PRN instead ordered Haldol 5mg TID for 48 hours and to be assessed by the attending provider on Monday. I gave only 10mg of Zyprexa at bedtime instead of 20mg. Pt was screaming and talking to himself in his room around 1830 but no outburst behavior noted. Pt denied having Si/SIB/AH/VH and anxiety but he appeared to be responding to internal stimuli. Pt voided good and had a BM per pt. Pt is not social with other pts ,usually he would be in his room sleeping or sitting out by himself.

## 2023-06-10 NOTE — PLAN OF CARE
Problem: Psychotic Symptoms  Goal: Psychotic Symptoms  Description: Signs and symptoms of listed problems will be absent or manageable.  Outcome: Progressing   Goal Outcome Evaluation:         Patient had flat affect. Mood was calm. Was out for meal and had minimal socialization with peers. Patient verbalized anxiety 5/10. Did not participate in group activities. Denied pain, anxiety, depression, covid 19 symptoms, SI/HI/SIB/AH/VH, and contracted for safety. Just before lunch, patient came to writer and stated that he had chest pain yesterday and would like tike to see a medical dcotor. Patient denied having pain presently. Patient was informed that test conducted yesterday indicated normal results. Patient insisted that medical doctor need to see him. Patient was noted agitated. Patient was redirected to talk about his anxiety and depression. Patient stated he was agitated and worried. Writer encouraged patient not to worry because lab and EKG done yesterday were all good result. Patient was given Zyprexa 10 mg for agitation, Hydroxyzine 50 mg for anxiety and scheduled medications. Had good appetite. Patient was calm for the rest of the shift and behavioral issues noted. Will continue to monitor and assist patient.

## 2023-06-11 PROCEDURE — 250N000013 HC RX MED GY IP 250 OP 250 PS 637: Performed by: CLINICAL NURSE SPECIALIST

## 2023-06-11 PROCEDURE — 128N000002 HC R&B CD/MH ADOLESCENT

## 2023-06-11 RX ADMIN — HYDROXYZINE HYDROCHLORIDE 50 MG: 25 TABLET, FILM COATED ORAL at 08:16

## 2023-06-11 RX ADMIN — TRAZODONE HYDROCHLORIDE 50 MG: 50 TABLET ORAL at 01:21

## 2023-06-11 RX ADMIN — ALUMINUM HYDROXIDE, MAGNESIUM HYDROXIDE, AND SIMETHICONE 30 ML: 200; 200; 20 SUSPENSION ORAL at 18:13

## 2023-06-11 RX ADMIN — HYDROXYZINE HYDROCHLORIDE 50 MG: 25 TABLET, FILM COATED ORAL at 23:23

## 2023-06-11 RX ADMIN — TRAZODONE HYDROCHLORIDE 50 MG: 50 TABLET ORAL at 23:23

## 2023-06-11 RX ADMIN — HYDROXYZINE HYDROCHLORIDE 50 MG: 25 TABLET, FILM COATED ORAL at 17:27

## 2023-06-11 RX ADMIN — OLANZAPINE 20 MG: 5 TABLET, FILM COATED ORAL at 19:23

## 2023-06-11 RX ADMIN — OLANZAPINE 10 MG: 10 TABLET, FILM COATED ORAL at 08:16

## 2023-06-11 ASSESSMENT — ACTIVITIES OF DAILY LIVING (ADL)
ADLS_ACUITY_SCORE: 28
ADLS_ACUITY_SCORE: 28
DRESS: INDEPENDENT
ADLS_ACUITY_SCORE: 28
HYGIENE/GROOMING: INDEPENDENT
ADLS_ACUITY_SCORE: 28
ORAL_HYGIENE: INDEPENDENT
LAUNDRY: WITH SUPERVISION
ADLS_ACUITY_SCORE: 28

## 2023-06-11 NOTE — PLAN OF CARE
Problem: Adult Behavioral Health Plan of Care  Goal: Optimized Coping Skills in Response to Life Stressors  Outcome: Progressing     Problem: Psychotic Symptoms  Goal: Psychotic Symptoms  Description: Signs and symptoms of listed problems will be absent or manageable.  Outcome: Progressing   Goal Outcome Evaluation:       Patient had flat affect. Mood was calm. Patient verbalized he feels good and denied pain, depression, covid 19 symptoms, SI/HI/SIB/AH/VH, and contracted for safety. Complained of anxiety 5/10.  Patient was mostly isolative to room sleeping and had minimal socialization with peers. Was out for both breakfast and lunch and had good appetite. Patient did not participate in group activities. Was Hydroxyzine 50 mg for anxiety and scheduled medications. Will continue to monitor and assist patient.

## 2023-06-11 NOTE — PLAN OF CARE
"  Problem: Psychotic Symptoms  Goal: Psychotic Symptoms  Description: Signs and symptoms of listed problems will be absent or manageable.  Outcome: Not Progressing   Goal Outcome Evaluation:  /71 (BP Location: Left arm)   Pulse 108   Temp 97.4  F (36.3  C) (Temporal)   Resp 16   SpO2 97%             Pt was in his room isolated, came out to eat dinner and went back to his room after dinner. RN saw pt interacting with other pt during dinner time. Good appetite and fluid intake ate 100% of his dinner. He was calm, cooperative and medication compliant. He had a flat blunted affect. Around 1700 pt was talking to himself out loud in the dining room, looking at the window \" I need you now, come back \" then he kneeled down on the floor and started praying. Tried to give him Haldol PRN pt refused he said, \" I am dozed off already\" Pt was c/o chest pain 8/10. He has a history of ulcerative colitis and acid reflux per pt. Gave Maalox after dinner and it was effective. Pt was clean and well groomed and took a shower. Pt denied having SI/SIB/AH/VH and anxiety.               "

## 2023-06-12 ENCOUNTER — TELEPHONE (OUTPATIENT)
Dept: BEHAVIORAL HEALTH | Facility: CLINIC | Age: 28
End: 2023-06-12
Payer: MEDICARE

## 2023-06-12 VITALS
SYSTOLIC BLOOD PRESSURE: 128 MMHG | HEART RATE: 110 BPM | OXYGEN SATURATION: 91 % | RESPIRATION RATE: 18 BRPM | TEMPERATURE: 97.2 F | DIASTOLIC BLOOD PRESSURE: 83 MMHG

## 2023-06-12 PROCEDURE — 250N000013 HC RX MED GY IP 250 OP 250 PS 637: Performed by: CLINICAL NURSE SPECIALIST

## 2023-06-12 PROCEDURE — 99239 HOSP IP/OBS DSCHRG MGMT >30: CPT | Performed by: CLINICAL NURSE SPECIALIST

## 2023-06-12 RX ORDER — OLANZAPINE 10 MG/1
10 TABLET ORAL DAILY
Qty: 30 TABLET | Refills: 0 | Status: SHIPPED | OUTPATIENT
Start: 2023-06-13

## 2023-06-12 RX ORDER — OLANZAPINE 20 MG/1
20 TABLET ORAL AT BEDTIME
Qty: 30 TABLET | Refills: 0 | Status: SHIPPED | OUTPATIENT
Start: 2023-06-12

## 2023-06-12 RX ADMIN — HYDROXYZINE HYDROCHLORIDE 50 MG: 25 TABLET, FILM COATED ORAL at 08:48

## 2023-06-12 RX ADMIN — OLANZAPINE 10 MG: 10 TABLET, FILM COATED ORAL at 08:47

## 2023-06-12 RX ADMIN — TRAZODONE HYDROCHLORIDE 50 MG: 50 TABLET ORAL at 00:20

## 2023-06-12 ASSESSMENT — ACTIVITIES OF DAILY LIVING (ADL)
ADLS_ACUITY_SCORE: 28
ORAL_HYGIENE: INDEPENDENT
LAUNDRY: WITH SUPERVISION
ADLS_ACUITY_SCORE: 28
DRESS: INDEPENDENT
ADLS_ACUITY_SCORE: 28
ADLS_ACUITY_SCORE: 28
HYGIENE/GROOMING: INDEPENDENT
ADLS_ACUITY_SCORE: 28
ADLS_ACUITY_SCORE: 28

## 2023-06-12 NOTE — PLAN OF CARE
Assessment/Intervention/Current Symptoms and Care Coordination    The patient's care was discussed with the treatment team and chart notes were reviewed     Current Symptoms include the following: suicidal    Called assessment center and made appt for June 20th    Spoke with pt about discharge         Discharge Plan or Goal   Return home with outpatient providers    Barriers to Discharge   None    Referral Status  Psychiatry: See upcoming meetings   DA: see upcoming meetings  Legal Status  Patient is voluntary        Contacts:   Assessment center: [phone]     Upcoming Meetings and Dates/Important Information and next steps:  Psychiatry appointment: Tuesday June 27th at 1:30pm in-office  Provider: Zbigniew Rodriguez MD  [facility]  [address] [place] [address]  Phone: [phone]  Fax: [phone]     Diagnostic Assessment at [facility] June 20th at 1pm in person   [address] [place] [address], [id number]  Phone: [phone]

## 2023-06-12 NOTE — CARE PLAN
"Goal Outcome Evaluation:  Problem: Sleep Disturbance  Goal: Adequate Sleep/Rest  Outcome: Ongoing, Declining    Focus: Shift summary    Data: Patient slept 2 hours last night. Patient continues to talk loudly to sleep and intermittently scream in middle of the night; Writer can hear patient talking to self in room stating apologies, swearing, and prayers. However, when writer approaches patient, patient is calm and cooperative and is able to maintain conversation with writer. Patient did have an episode x 1 at 0020 when writer came in to give patient trazodone where patient got on his hands and feet and began loudly stating \"Lord I'm sorry\" before receiving trazodone 50 mg from writer; patient was offered PRN Haldol 5 mg but refused tonight. Sleep interventions proved to be ineffective also as patient was awake the majority of the night occasionally talking to self and loudly stating \"Yeeeee\" in a high pitched voice multiple times; Staff attempted to redirect behaviors but patient would state \"okay\" and then continue after a few minutes. Respirations even and unlabored on status 15 checks. Will continue to monitor and report to oncoming staff.    Response: Report sleep hours to day shift. Continue to monitor patient and provide therapeutic interventions as necessary.    "

## 2023-06-12 NOTE — PLAN OF CARE
Problem: Suicidal Behavior  Goal: Suicidal Behavior is Absent or Managed  Outcome: Progressing   Goal Outcome Evaluation:  /83 (BP Location: Right arm)   Pulse 110   Temp 97.2  F (36.2  C) (Skin)   Resp 18   SpO2 91%       Patient discharging 6/12/2023 accompanied by his sister and destination is  group home.Discharge paperwork and medications reviewed with  pt copies provided: AVS and medications gave to his sister, pt had a history of overdose, she will give to his group home staff.     Discharge Flow sheet completed    Care plan completed     Education completed    Pt was alert and oriented x 3. Denied having pain and discomfort. Denied having SI/SIB/AH/VH and anxiety.   pt identifying reasons for hospitalization and goals for discharge. Pt left the unit with RN around 1610.

## 2023-06-12 NOTE — CARE PLAN
"Focus: PRN Administration/Behavior    Data: Patient given repeat dose of trazodone 50 mg at this time for sleep promotion. Patient continues to talk loudly to self in room - when writer came into patients room patient said \"thank you, you can leave it on the table\". Writer waited to give medication to patient and patient got on hands and knees and started stating \"Lord I Holiness you\" multiple times before getting up and taking medication. Writer offered haldol PRN as patient appears to be responding to internal stimuli but patient declined.     Response: Continue to monitor behaviors. Patient continues to talk to self and now is Worship preoccupied per writer observation. Patient refused haldol PRN but agreed to trazodone 50 mg PO PRN; will continue to encourage haldol usage for auditory hallucinations.  "

## 2023-06-12 NOTE — PLAN OF CARE
Problem: Adult Behavioral Health Plan of Care  Goal: Optimized Coping Skills in Response to Life Stressors  Outcome: Progressing     Problem: Suicidal Behavior  Goal: Suicidal Behavior is Absent or Managed  Outcome: Progressing   Goal Outcome Evaluation:        Patient had flat affect. Mood was calm. Patient verbalized feeling good. Was noted talking to self but not loud during meal time and and while in the room. Patient denied pain, anxiety, depression. SI/HI/SIB/AH/VH and contracted for safety. Patient spent most of the shift sleeping. He came out for meals. Patient had earlier called his family and told them that he will be discharged at 12 noon. At that time there was no discharge orders. Family on coming to  patient was informed that patient was not discharged which made them to be upset. At 1351 discharge orders was placed. Patient was informed he was discharge and he said he will like to leave at 4 pm. Will continue to monitor and assist patient.

## 2023-06-12 NOTE — TELEPHONE ENCOUNTER
6/12/23: Pt is a(n) adult (18+ out of HS) Seeking as eval for Adult Mental Health DA for Programmatic Care (Interested in DT, PHP, DDP, 55+). and is interested in Adult Mental Health or Dual Diagnosis Program (Either In-Person or Virtual).  Appointment scheduled by:  Other  (do not run cost estimate if pt not calling for the appt themselves - send for bens)  Caller name:  Tia Baker [facility]    Caller phone #: [phone]  If in person, please state reason why:  Procedure  Brief reason for appt:  Pt is discharging from [facility] and is referred to IOP    Cost estimate Did not get completed.  Contact information verified/updated: No, not speaking with pt. MB stated that it is correct.

## 2023-06-12 NOTE — DISCHARGE SUMMARY
"Psychiatric Discharge Summary    Serg Tejada MRN# [id number]   Age: 28 year old YOB: 1995     Date of Admission:  2023  Date of Discharge:  2023  Admitting Physician:  Zhen Santos MD  Discharge Physician:  Debra A. Naegele, APRN CNS (Contact: [phone])         Event Leading to Hospitalization:   Serg Tejada is a 28 year old  male presenting s/p overdose attempt with prescribed venlafaxine. Patient reports he has been stressed out the last couple of weeks because his mother recently  from a staph infection. Patient reports his mother \"got and infection and  within a week.\" Patient reports he does not have a lot of family support. His father lives in [place]. He has a younger sister whom he wants to \"be strong for\". Patient reports he was hearing the voice of God to overdose on medications. Patient says he has been experiencing auditory hallucinations for the last 5-6years. Patient reports \"the voice of God\" is comforting.. This is the first time the voice told him to harm self.     Patient reports he is not depressed. He reports, the voice is positive. Patient reports he is looking forward to his sister graduating form high school. 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 He wants to buy a car. Patient is future oriented. Patient reports he has been taking his medications and does not feel he needs a medication change.          See Admission note by Naegele, Debra Ann, APRN CNS on 2023    for additional details.          DIagnoses:     1. S/p serious overdose attempt with venlafaxine   2. Schizophrenia, undifferitiated             Labs:     Results for orders placed or performed during the hospital encounter of 23   Troponin T, High Sensitivity     Status: Normal   Result Value Ref Range    Troponin T, High Sensitivity 6 <=22 ng/L   Glucose by meter     Status: Abnormal   Result Value Ref Range    " GLUCOSE BY METER POCT 127 (H) 70 - 99 mg/dL   Magnesium     Status: Normal   Result Value Ref Range    Magnesium 2.1 1.7 - 2.3 mg/dL   EKG 12-lead, complete     Status: None (Preliminary result)   Result Value Ref Range    Systolic Blood Pressure  mmHg    Diastolic Blood Pressure  mmHg    Ventricular Rate 86 BPM    Atrial Rate 86 BPM    KY Interval 126 ms    QRS Duration 108 ms     ms    QTc 449 ms    P Axis 26 degrees    R AXIS 22 degrees    T Axis 11 degrees    Interpretation ECG       Sinus rhythm  Normal ECG  When compared with ECG of 07-JUN-2023 07:42,  No significant change was found              Consults:   No consultations were requested during this admission         Hospital Course:   Serg Tejada was [facility] to [facility] (young adult) with attending Zhen Santos MD as a voluntary patient. The patient was placed under status 15 (15 minute checks) to ensure patient safety.     Patient's Zyprexa increased to 10 mg daily and 20 mg at bedtime to address Gnosticism preoccupation and auditory hallucinations. Patient was not restarted in Effexor due to overdose. Provider discussed locking up all meds. Patient said his medications are locked at the [facility]. Provider discussed risks, benefits and side effects of medications with patient.    Medical: Obesity, hyperglycemia  Patient declined nutrition consult.         Serg Tejada did participate in groups and was visible in the milieu.     The patient's symptoms of suicidal ideation improved.Patietn reports improved mood and denies suicidal ideation. He is future oriented. He is looking forward to starting a new job. He has support from his sister.  He is  religiously preoccupied.     Serg Tejada was released to [facility] [place] with outpatient day treatment. . At the time of discharge Serg Tejada was determined to not be a danger to himself or others.          Discharge Medications:     Current Discharge Medication List       CONTINUE these medications which have CHANGED    Details   !! OLANZapine (ZYPREXA) 10 MG tablet Take 1 tablet (10 mg) by mouth daily  Qty: 30 tablet, Refills: 0    Associated Diagnoses: Undifferentiated schizophrenia (H)      !! OLANZapine (ZYPREXA) 20 MG tablet Take 1 tablet (20 mg) by mouth At Bedtime  Qty: 30 tablet, Refills: 0    Associated Diagnoses: Undifferentiated schizophrenia (H)       !! - Potential duplicate medications found. Please discuss with provider.      CONTINUE these medications which have NOT CHANGED    Details   melatonin 1 MG TABS tablet Take 1 tablet (1 mg) by mouth nightly as needed for sleep    Associated Diagnoses: Insomnia, unspecified type         STOP taking these medications       hydrOXYzine (ATARAX) 25 MG tablet Comments:   Reason for Stopping:                    Psychiatric Examination:   Appearance:  awake, alert and adequately groomed  Attitude:  cooperative  Eye Contact:  good  Mood:  better  Affect:  appropriate and in normal range  Speech:  normal prosody  Psychomotor Behavior:  no evidence of tardive dyskinesia, dystonia, or tics  Thought Process:  goal oriented  Associations:  no loose associations  Thought Content:  no evidence of suicidal ideation or homicidal ideation and auditory hallucinations present  Insight:  fair  Judgment:  fair  Oriented to:  time, person, and place  Attention Span and Concentration:  intact  Recent and Remote Memory:  intact  Language: Able to name objects, Able to repeat phrases and Able to read and write  Fund of Knowledge: low-normal  Muscle Strength and Tone: normal  Gait and Station: Normal         Discharge Plan:       Further instructions from your care team       Behavioral Discharge Planning and Instructions    Summary: You were admitted on 6/7/2023  due to suicide attempt and psychosis.  You were treated by Debra Naegele APRN and discharged 06/12/2023 on [facility] from [facility] to  Family    Main Diagnosis:   1. S/p serious overdose attempt  "with venlafaxine   2. Schizophrenia, undifferitiated     Health Care Follow-up:     Psychiatry appointment: Tuesday June 27th at 1:30pm in-office  Provider: Zbigniew Rodriguez MD  [facility]  [address] [place] [address]  Phone: [phone]  Fax: [phone]    Diagnostic Assessment at [facility] June 20th at 1pm in person   [address]   [facility], [address]  Phone: [phone]   Go to room [address] next to the [address].     Attend all scheduled appointments with your outpatient providers. Call at least 24 hours in advance if you need to reschedule an appointment to ensure continued access to your outpatient providers.     Major Treatments, Procedures and Findings:  You were provided with: a psychiatric assessment, assessed for medical stability, medication evaluation and/or management, group therapy, milieu management, and medical interventions    Symptoms to Report: feeling more aggressive, increased confusion, losing more sleep, mood getting worse, or thoughts of suicide    Early warning signs can include: increased depression or anxiety sleep disturbances increased thoughts or behaviors of suicide or self-harm  increased unusual thinking, such as paranoia or hearing voices    Safety and Wellness:  Take all medicines as directed.  Make no changes unless your doctor suggests them.      Follow treatment recommendations.  Refrain from alcohol and non-prescribed drugs.  If there is a concern for safety, call [phone].    Resources:   Crisis Intervention: 192.806.3329 or 534-901-3161 (TTY: 538.741.4207).  Call anytime for help.  National [place] on Mental Illness (www.[place].ezra.org): 172.976.6673 or 249-661-3278.  Suicide Awareness Voices of Education (SAVE) (www.save.org): 793-148-AERL (9496)  National Suicide Prevention Line (www.mentalhealthmn.org): 237-818-TPYC (4998)  [facility] Crisis (COPE) Response - Adult 324 524-2547  Text 4 Life: txt \"LIFE\" to 94049 for " immediate support and crisis intervention    General Medication Instructions:   See your medication sheet(s) for instructions.   Take all medicines as directed.  Make no changes unless your doctor suggests them.   Go to all your doctor visits.  Be sure to have all your required lab tests. This way, your medicines can be refilled on time.  Do not use any drugs not prescribed by your doctor.  Avoid alcohol.    Advance Directives:   Scanned document on file with [place]? No scanned doc  Is document scanned? No. Copy Requested.  Honoring Choices Your Rights Handout: Informed and given  Was more information offered? Pt declined    The Treatment team has appreciated the opportunity to work with you. If you have any questions or concerns about your recent admission, you can contact the unit which can receive your call 24 hours a day, 7 days a week. They will be able to get in touch with a Provider if needed. The unit number is [phone] .     Attestation:  The patient has been seen and evaluated by me,  Debra A. Naegele, APRN CNS on 6/12/2023  Discharge summary time > 30 minutes

## 2023-06-14 ENCOUNTER — PATIENT OUTREACH (OUTPATIENT)
Dept: CARE COORDINATION | Facility: CLINIC | Age: 28
End: 2023-06-14
Payer: MEDICARE

## 2023-06-14 LAB
ATRIAL RATE - MUSE: 86 BPM
DIASTOLIC BLOOD PRESSURE - MUSE: NORMAL MMHG
INTERPRETATION ECG - MUSE: NORMAL
P AXIS - MUSE: 26 DEGREES
PR INTERVAL - MUSE: 126 MS
QRS DURATION - MUSE: 108 MS
QT - MUSE: 376 MS
QTC - MUSE: 449 MS
R AXIS - MUSE: 22 DEGREES
SYSTOLIC BLOOD PRESSURE - MUSE: NORMAL MMHG
T AXIS - MUSE: 11 DEGREES
VENTRICULAR RATE- MUSE: 86 BPM

## 2023-06-14 NOTE — PROGRESS NOTES
"Clinic Care Coordination Contact  [facility]/Voicemail       Clinical Data: Care Coordinator Outreach-TCM  Outreach attempted x 2. Unable to leave message as \"call cannot be completed at this time\" message  Plan: Care Coordinator will make no further outreaches at this time.    JAMES Manuel   Social Work Clinic Care Coordinator   [facility]  PH: [phone]  [email or web address]    "

## 2024-06-14 ENCOUNTER — HOSPITAL ENCOUNTER (EMERGENCY)
Facility: CLINIC | Age: 29
Discharge: NURSING, REHAB OR LONG-TERM CARE | End: 2024-06-14
Attending: EMERGENCY MEDICINE | Admitting: EMERGENCY MEDICINE
Payer: MEDICARE

## 2024-06-14 ENCOUNTER — APPOINTMENT (OUTPATIENT)
Dept: GENERAL RADIOLOGY | Facility: CLINIC | Age: 29
End: 2024-06-14
Attending: EMERGENCY MEDICINE
Payer: MEDICARE

## 2024-06-14 DIAGNOSIS — S93.421A SPRAIN OF RIGHT MEDIAL ANKLE JOINT, INITIAL ENCOUNTER: ICD-10-CM

## 2024-06-14 PROCEDURE — 73610 X-RAY EXAM OF ANKLE: CPT | Mod: RT

## 2024-06-14 PROCEDURE — 99283 EMERGENCY DEPT VISIT LOW MDM: CPT

## 2024-06-14 ASSESSMENT — COLUMBIA-SUICIDE SEVERITY RATING SCALE - C-SSRS
1. IN THE PAST MONTH, HAVE YOU WISHED YOU WERE DEAD OR WISHED YOU COULD GO TO SLEEP AND NOT WAKE UP?: NO
6. HAVE YOU EVER DONE ANYTHING, STARTED TO DO ANYTHING, OR PREPARED TO DO ANYTHING TO END YOUR LIFE?: NO
2. HAVE YOU ACTUALLY HAD ANY THOUGHTS OF KILLING YOURSELF IN THE PAST MONTH?: NO

## 2024-06-14 NOTE — ED TRIAGE NOTES
Presents to triage with c/o R ankle pain. Patient was walking on sidewalk and rolled his ankle when he came to an uneven spot in the pavement. He then fell on the pavement. Denies hitting head, LOC, blood thinner use, or any injuries from the fall. Now c/o R ankle pain and swelling. CMS intact.

## 2024-06-14 NOTE — ED PROVIDER NOTES
Emergency Department Note      History of Present Illness     Chief Complaint  Ankle Pain    ARNAUD Tejada is a 29 year old male without significant past medical history presenting for evaluation of right ankle pain.  He was walking from gas station when he rolled his ankle uneven pavement, he subsequently tried to walk several more times, continue to rule out prompting his visit.  He denies weakness, did not fall, does not have neck or knee pain.  Did not hit his head.    Independent Historian  None    Review of External Notes  None  Past Medical History   Medical History and Problem List  No past medical history on file.    Medications  melatonin 1 MG TABS tablet  OLANZapine (ZYPREXA) 10 MG tablet  OLANZapine (ZYPREXA) 20 MG tablet        Surgical History   No past surgical history on file.  Physical Exam   Constitutional: Alert, attentive, GCS 15   Eyes: EOM are normal, anicteric, conjugate gaze  CV: distal extremities warm, well perfused  Chest: Non-labored breathing on RA  MSK: Diffuse tenderness along the medial right ankle without overt significant swelling, there is mild lateral swelling, no proximal leg tenderness, no forefoot tenderness.  Neurological: Alert, attentive, moving all extremities equally.   Skin: Skin is warm and dry.      Imaging  Ankle XR, G/E 3 views, right   Final Result   IMPRESSION: Lateral soft tissue swelling. There is no evidence of   fracture or talar dome osteochondral lesion. The ankle mortise is   symmetric.       ARI UNGER MD            SYSTEM ID:  [id number]            Independent Interpretation  I personally viewed the x-ray, see no evidence of fracture.    ED Course    Medications Administered  Medications - No data to display    Procedures  Procedures     Discussion of Management  None    Social Determinants of Health adding to complexity of care  None    ED Course     Medical Decision Making / Diagnosis   MDM  This is a very pleasant 29 year old male who  presented with a R ankle injury consistent with sprain, with no evidence of fracture on x-ray. There is no proximal tenderness or pain to suggest tib/fib or knee injury. There is no midfoot, calcaneus or base of 5th MT tenderness to suggest midfoot or calcaneus injury.  The patient was placed in an air splint and given norco with good pain relief. I advised ibuprofen, ice, rest and elevation. The patient is ambulatory without splinting, he declined it. I advised strict return precautions for worse pain, swelling, numbness, or any other concerning symptoms, as well as follow-up with primary care in 3-5 days.      Disposition  The patient was discharged.     ICD-10 Codes:    ICD-10-CM    1. Sprain of right medial ankle joint, initial encounter  S93.421A            Raheem Giron MD  [employer]  4:00 PM 06/14/24          Raheem Giron MD  06/14/24 1600